# Patient Record
Sex: FEMALE | Race: WHITE | NOT HISPANIC OR LATINO | Employment: OTHER | URBAN - METROPOLITAN AREA
[De-identification: names, ages, dates, MRNs, and addresses within clinical notes are randomized per-mention and may not be internally consistent; named-entity substitution may affect disease eponyms.]

---

## 2018-11-12 ENCOUNTER — OFFICE VISIT (OUTPATIENT)
Dept: ORTHOPEDIC SURGERY | Facility: CLINIC | Age: 68
End: 2018-11-12

## 2018-11-12 VITALS — HEIGHT: 67 IN | HEART RATE: 72 BPM | WEIGHT: 158.29 LBS | OXYGEN SATURATION: 98 % | BODY MASS INDEX: 24.84 KG/M2

## 2018-11-12 DIAGNOSIS — M25.551 PAIN OF RIGHT HIP JOINT: Primary | ICD-10-CM

## 2018-11-12 PROCEDURE — 99203 OFFICE O/P NEW LOW 30 MIN: CPT | Performed by: ORTHOPAEDIC SURGERY

## 2018-11-12 RX ORDER — GABAPENTIN 300 MG/1
300 CAPSULE ORAL 3 TIMES DAILY
COMMUNITY

## 2018-11-12 RX ORDER — LEVOTHYROXINE SODIUM 0.12 MG/1
125 TABLET ORAL DAILY
COMMUNITY
End: 2022-04-13 | Stop reason: HOSPADM

## 2018-11-12 RX ORDER — DEXTROAMPHETAMINE SACCHARATE, AMPHETAMINE ASPARTATE, DEXTROAMPHETAMINE SULFATE AND AMPHETAMINE SULFATE 5; 5; 5; 5 MG/1; MG/1; MG/1; MG/1
20 TABLET ORAL DAILY
COMMUNITY
End: 2022-04-13 | Stop reason: HOSPADM

## 2018-11-12 RX ORDER — ATORVASTATIN CALCIUM 20 MG/1
20 TABLET, FILM COATED ORAL DAILY
COMMUNITY
End: 2022-05-03 | Stop reason: SDUPTHER

## 2018-11-12 RX ORDER — IBUPROFEN 800 MG/1
800 TABLET ORAL EVERY 6 HOURS PRN
COMMUNITY

## 2018-11-12 NOTE — PROGRESS NOTES
[facility] [place] [facility] [place] [facility] [place]    Subjective     Chief Complaint: Right hip pain       HPI    Oneyda Luna is a 68 y.o. female.  She presents today for evaluation of right lateral hip pain.  The pain is been present for 2 years, following no particular injury.  The pain is associated with stiffness, and worsens with walking, as well as sleeping on her side.  The pain is 4 out of 10, and aching in quality.  She may have had a previous injection before, but she is not sure.      There is no problem list on file for this patient.    Past Medical History:   Diagnosis Date   • Graves disease    • Heart disease    • Hip arthrosis    • Knee swelling       Past Surgical History:   Procedure Laterality Date   • CARDIAC SURGERY      2017 stints   • WRIST SURGERY      2004 right wrist      Family History   Problem Relation Age of Onset   • Osteoarthritis Mother    • Hypertension Mother    • Heart attack Mother    • Osteoarthritis Father    • Hypertension Father      Social History     Socioeconomic History   • Marital status:      Spouse name: Not on file   • Number of children: Not on file   • Years of education: Not on file   • Highest education level: Not on file   Social Needs   • Financial resource strain: Not on file   • Food insecurity - worry: Not on file   • Food insecurity - inability: Not on file   • Transportation needs - medical: Not on file   • Transportation needs - non-medical: Not on file   Occupational History   • Not on file   Tobacco Use   • Smoking status: Former Smoker     Years: 30.00   • Smokeless tobacco: Never Used   • Tobacco comment: Feb 2017   Substance and Sexual Activity   • Alcohol use: No     Frequency: Never   • Drug use: No   • Sexual activity: Defer   Other Topics Concern   • Not on file   Social History Narrative   • Not on file      Current Outpatient Medications on File Prior to Visit   Medication Sig Dispense Refill   • amphetamine-dextroamphetamine (ADDERALL) 20 MG  "tablet Take 20 mg by mouth Daily.     • atorvastatin (LIPITOR) 20 MG tablet Take 20 mg by mouth Daily.     • Cholecalciferol (VITAMIN D3) 5000 units capsule capsule Take 5,000 Units by mouth Daily.     • estrogens, conjugated, (PREMARIN) 0.3 MG tablet Take 0.3 mg by mouth Daily. Take daily for 21 days then do not take for 7 days.     • gabapentin (NEURONTIN) 300 MG capsule Take 300 mg by mouth 3 (Three) Times a Day.     • ibuprofen (ADVIL,MOTRIN) 800 MG tablet Take 800 mg by mouth Every 6 (Six) Hours As Needed for Mild Pain .     • levothyroxine (SYNTHROID, LEVOTHROID) 125 MCG tablet Take 125 mcg by mouth Daily.       No current facility-administered medications on file prior to visit.       Allergies   Allergen Reactions   • Molds & Smuts Other (See Comments)     Sneezing and congestion          Review of Systems   Constitutional: Positive for activity change, fatigue and unexpected weight change.   HENT: Positive for congestion, ear pain, facial swelling, mouth sores, sinus pressure and sore throat.    Eyes: Positive for visual disturbance.   Respiratory: Positive for cough, choking and shortness of breath.    Gastrointestinal: Positive for abdominal distention.   Endocrine: Positive for cold intolerance and heat intolerance.   Genitourinary: Positive for frequency, pelvic pain and urgency.   Musculoskeletal: Positive for arthralgias, back pain, gait problem, joint swelling, myalgias, neck pain and neck stiffness.   Neurological: Positive for weakness and numbness.   Psychiatric/Behavioral: Positive for confusion.        Objective      Physical Exam  Pulse 72   Ht 170.2 cm (67\")   Wt 71.8 kg (158 lb 4.6 oz)   SpO2 98%   BMI 24.79 kg/m²     Body mass index is 24.79 kg/m².    General:   Mental Status:  Alert   Appearance: Cooperative, in no acute distress   Build and Nutrition: Well-nourished and well developed female   Orientation: Alert and oriented to person, place and time   Posture: Normal   Gait: " Normal    Integument:   Right hip: No skin lesions, no rash, no ecchymosis    Neurologic:   Sensation:    Right foot: Intact to light touch on the dorsal and plantar aspect   Motor:  Right lower extremity: 5/5 quadriceps, hamstrings, ankle dorsiflexors, and ankle plantar flexors    Vascular:   Right lower extremity: 2+ dorsalis pedis pulse, prompt capillary refill    Lower Extremities:   Right Hip:    Tenderness:  Over the greater trochanter    Swelling: None    Crepitus:  None    Atrophy:  None    Range of motion:  External Rotation: 30°       Internal Rotation: 30°       Flexion:  100°       Extension:  0°   Instability:  None  Deformities:  Soft tissues are sunken in just below the greater trochanter, different than the contralateral side  Functional testing: Negative Stinchfield    No leg length discrepancy        Imaging/Studies    See x-ray report from today.  Findings are consistent with mild arthritis in the hip.    Assessment and Plan     Diagnoses and all orders for this visit:    Pain of right hip joint  -     XR Hip With or Without Pelvis 2 - 3 View Right; Future  -     MRI Hip Right Without Contrast; Future        I reviewed my findings with patient today.  Findings are consistent with trochanteric bursitis, which she does have an area of soft tissue atrophied distal to this.  I would like an MRI of her hip, and this is been ordered.  I will see her back after the MRI, but sooner for any problems.    Return for After Imaging Study.      Medical Decision Making  Data/Risk: radiology tests and independent visualization of imaging, lab tests, or EMG/NCV      Ramesh Gerardo MD  11/12/18  2:43 PM

## 2018-11-20 ENCOUNTER — HOSPITAL ENCOUNTER (OUTPATIENT)
Dept: MRI IMAGING | Facility: HOSPITAL | Age: 68
Discharge: HOME | End: 2018-11-20
Attending: ORTHOPAEDIC SURGERY | Admitting: ORTHOPAEDIC SURGERY

## 2018-11-20 DIAGNOSIS — M25.551 PAIN OF RIGHT HIP JOINT: ICD-10-CM

## 2018-11-20 PROCEDURE — 73721 MRI JNT OF LWR EXTRE W/O DYE: CPT

## 2018-12-05 ENCOUNTER — OFFICE VISIT (OUTPATIENT)
Dept: ORTHOPEDIC SURGERY | Facility: CLINIC | Age: 68
End: 2018-12-05

## 2018-12-05 VITALS — OXYGEN SATURATION: 100 % | BODY MASS INDEX: 24.22 KG/M2 | HEIGHT: 67 IN | HEART RATE: 91 BPM | WEIGHT: 154.32 LBS

## 2018-12-05 DIAGNOSIS — M24.851 SNAPPING HIP SYNDROME, RIGHT: Primary | ICD-10-CM

## 2018-12-05 PROCEDURE — 99213 OFFICE O/P EST LOW 20 MIN: CPT | Performed by: ORTHOPAEDIC SURGERY

## 2018-12-05 RX ORDER — VORTIOXETINE 20 MG/1
TABLET, FILM COATED ORAL
COMMUNITY
Start: 2018-11-27 | End: 2022-04-13 | Stop reason: HOSPADM

## 2018-12-05 NOTE — PROGRESS NOTES
[facility] – [place] [facility] Note    Subjective     Chief Complaint   Patient presents with   • Right Hip - Follow-up     Pain of Right Hip Joint; Post MRI         HPI    Oneyda Luna is a 68 y.o. female.  She follows up today for her right hip MRI results.  No new complaints today.  4 out of 10 pain, with snapping in the hip.  Worse with walking and climbing stairs.  No improvement with anti-inflammatories.      There is no problem list on file for this patient.    Past Medical History:   Diagnosis Date   • Graves disease    • Heart disease    • Hip arthrosis    • Knee swelling       Past Surgical History:   Procedure Laterality Date   • CARDIAC SURGERY      2017 stints   • WRIST SURGERY      2004 right wrist      Family History   Problem Relation Age of Onset   • Osteoarthritis Mother    • Hypertension Mother    • Heart attack Mother    • Osteoarthritis Father    • Hypertension Father      Social History     Socioeconomic History   • Marital status:      Spouse name: Not on file   • Number of children: Not on file   • Years of education: Not on file   • Highest education level: Not on file   Social Needs   • Financial resource strain: Not on file   • Food insecurity - worry: Not on file   • Food insecurity - inability: Not on file   • Transportation needs - medical: Not on file   • Transportation needs - non-medical: Not on file   Occupational History   • Not on file   Tobacco Use   • Smoking status: Former Smoker     Years: 30.00   • Smokeless tobacco: Never Used   • Tobacco comment: Feb 2017   Substance and Sexual Activity   • Alcohol use: No     Frequency: Never   • Drug use: No   • Sexual activity: Defer   Other Topics Concern   • Not on file   Social History Narrative   • Not on file      Current Outpatient Medications on File Prior to Visit   Medication Sig Dispense Refill   • amphetamine-dextroamphetamine (ADDERALL) 20 MG tablet Take 20 mg by mouth Daily.     • atorvastatin (LIPITOR) 20 MG  tablet Take 20 mg by mouth Daily.     • Cholecalciferol (VITAMIN D3) 5000 units capsule capsule Take 5,000 Units by mouth Daily.     • estrogens, conjugated, (PREMARIN) 0.3 MG tablet Take 0.3 mg by mouth Daily. Take daily for 21 days then do not take for 7 days.     • gabapentin (NEURONTIN) 300 MG capsule Take 300 mg by mouth 3 (Three) Times a Day.     • ibuprofen (ADVIL,MOTRIN) 800 MG tablet Take 800 mg by mouth Every 6 (Six) Hours As Needed for Mild Pain .     • levothyroxine (SYNTHROID, LEVOTHROID) 125 MCG tablet Take 125 mcg by mouth Daily.     • TRINTELLIX 20 MG tablet        No current facility-administered medications on file prior to visit.       Allergies   Allergen Reactions   • Molds & Smuts Other (See Comments)     Sneezing and congestion          Review of Systems   Constitutional: Positive for fatigue. Negative for activity change, appetite change, chills, diaphoresis, fever and unexpected weight change.   HENT: Negative for congestion, dental problem, drooling, ear discharge, ear pain, facial swelling, hearing loss, mouth sores, nosebleeds, postnasal drip, rhinorrhea, sinus pressure, sneezing, sore throat, tinnitus, trouble swallowing and voice change.    Eyes: Negative for photophobia, pain, discharge, redness, itching and visual disturbance (Without Glasses ).   Respiratory: Negative for apnea, cough, choking, chest tightness, shortness of breath, wheezing and stridor.    Cardiovascular: Negative for chest pain, palpitations and leg swelling.   Gastrointestinal: Negative for abdominal distention, abdominal pain, anal bleeding, blood in stool, constipation, diarrhea, nausea, rectal pain and vomiting.   Endocrine: Negative for cold intolerance, heat intolerance, polydipsia, polyphagia and polyuria.   Genitourinary: Negative for decreased urine volume, difficulty urinating, dysuria, enuresis, flank pain, frequency, genital sores, hematuria and urgency.   Musculoskeletal: Positive for joint swelling.  "Negative for arthralgias, back pain, gait problem, myalgias, neck pain and neck stiffness.   Skin: Negative for color change, pallor, rash and wound.   Allergic/Immunologic: Negative for environmental allergies, food allergies and immunocompromised state.   Neurological: Negative for dizziness, tremors, seizures, syncope, facial asymmetry, speech difficulty, weakness, light-headedness, numbness and headaches.   Hematological: Negative for adenopathy. Does not bruise/bleed easily.   Psychiatric/Behavioral: Negative for agitation, behavioral problems, confusion, decreased concentration, dysphoric mood, hallucinations, self-injury, sleep disturbance and suicidal ideas. The patient is not nervous/anxious and is not hyperactive.         Objective      Physical Exam  Pulse 91   Ht 170.2 cm (67.01\")   Wt 70 kg (154 lb 5.2 oz)   SpO2 100%   BMI 24.16 kg/m²     Body mass index is 24.16 kg/m².    General:   Mental Status:  Alert   Appearance: Cooperative, in no acute distress   Build and Nutrition: Well-nourished and well developed female   Orientation: Alert and oriented to person, place and time   Posture: Normal   Gait: Normal    Integument:   Right hip: No skin lesions, no rash, no ecchymosis    Lower Extremity:   Right Hip:    Tenderness:  Mild lateral tenderness    Swelling:  None    Crepitus:  None    Range of motion:  External Rotation: 30°       Internal Rotation: 30°       Flexion:  100°       Extension:  0°    Deformities:  None  Functional testing: Negative Stinchfield    No leg length discrepancy        Imaging/Studies  EXAMINATION: MRI HIP RIGHT WO CONTRAST- 11/20/2018     INDICATION: M25.551-Pain in right hip         TECHNIQUE: Exam consists of axial and coronal T1, T2, proton density and  STIR images of the pelvis and hips, small field-of-view sagittal T2  fat-sat and coronal 3-D gradient echo images of the right hip.     COMPARISON: Right hip plain films 11/12/2018     FINDINGS: Patient history indicates " right lateral hip pain X2 years,  gradually worsening. No particular injury. Exam findings consistent with  trochanteric bursitis.     No marrow edema or other pathologic marrow signal changes are seen in  the included pelvic bones or proximal femurs. In particular, no evidence  of fracture or stress reaction is seen in the femoral head or neck  regions, right or left acetabulum or right or left pubic rami. There is  no evidence of significant joint effusion. Femoral head contours are  normally rounded. STIR images show no significant muscle or soft tissue  edema. Remaining sequences show no evidence of asymmetric muscle atrophy  or hematoma. Attachments of the right and left iliopsoas tendons,  gluteus and hamstring attachments appear normal. In particular, no right  gluteus medius or minimus tendinopathy is identified. No intrapelvic  mass inflammatory change or ascites is seen.     Small field-of-view images show grossly normal-appearing outline of the  articular cartilage of the right hip. There is a 1.1 cm anterior  paralabral cyst, and what appears to be fluid signal extending across  the base of the labrum, suspicious for anterior labral tear.     IMPRESSION:  1. Suspected anterior labral tear of the right hip and small associated  paralabral cyst.  2. No evidence of significant right hip pathology elsewhere.     D:  11/20/2018  E:  11/21/2018      Assessment and Plan     Oneyda was seen today for follow-up.    Diagnoses and all orders for this visit:    Snapping hip syndrome, right  -     Ambulatory Referral to Physical Therapy Evaluate and treat        I reviewed my findings with patient today.  MRI was fairly unremarkable, with only mild degeneration of the hip.  No significant bursal changes, and the gluteal musculature is intact.  She describes popping in the hip, and I suspect that she has a snapping hip.  At this point, recommended a trial of physical therapy, and a referral was provided.  I will see  her back in 2 months to ensure improvement, but sooner for any problems.    Return in about 2 months (around 2/5/2019).      Medical Decision Making  Management Options : physical/occupational therapy  Data/Risk: independent visualization of imaging, lab tests, or EMG/NCV      Ramesh Gerardo MD  12/05/18  4:03 PM

## 2019-02-06 ENCOUNTER — OFFICE VISIT (OUTPATIENT)
Dept: ORTHOPEDIC SURGERY | Facility: CLINIC | Age: 69
End: 2019-02-06

## 2019-02-06 VITALS — HEART RATE: 112 BPM | WEIGHT: 149.03 LBS | BODY MASS INDEX: 23.39 KG/M2 | HEIGHT: 67 IN | OXYGEN SATURATION: 95 %

## 2019-02-06 DIAGNOSIS — M25.551 PAIN OF RIGHT HIP JOINT: ICD-10-CM

## 2019-02-06 DIAGNOSIS — M24.851 SNAPPING HIP SYNDROME, RIGHT: Primary | ICD-10-CM

## 2019-02-06 PROCEDURE — 99212 OFFICE O/P EST SF 10 MIN: CPT | Performed by: ORTHOPAEDIC SURGERY

## 2019-02-06 NOTE — PROGRESS NOTES
[facility] [place] [facility] [place]    Subjective     Chief Complaint   Patient presents with   • Right Hip - Follow-up     2 month follow up.        HPI    Oneyda Luna is a 68 y.o. female.  She follows up today for right hip.  She is improved from her last visit, and is not experiencing popping.  She did not do physical therapy as discussed.  She had a neck problem that precluded that.  She has occasional 2 out of 10 pain in the hip.  No new complaints today.      There is no problem list on file for this patient.    Past Medical History:   Diagnosis Date   • Graves disease    • Heart disease    • Hip arthrosis    • Knee swelling       Past Surgical History:   Procedure Laterality Date   • CARDIAC SURGERY      2017 stints   • WRIST SURGERY      2004 right wrist      Family History   Problem Relation Age of Onset   • Osteoarthritis Mother    • Hypertension Mother    • Heart attack Mother    • Osteoarthritis Father    • Hypertension Father      Social History     Socioeconomic History   • Marital status:      Spouse name: Not on file   • Number of children: Not on file   • Years of education: Not on file   • Highest education level: Not on file   Social Needs   • Financial resource strain: Not on file   • Food insecurity - worry: Not on file   • Food insecurity - inability: Not on file   • Transportation needs - medical: Not on file   • Transportation needs - non-medical: Not on file   Occupational History   • Not on file   Tobacco Use   • Smoking status: Former Smoker     Years: 30.00   • Smokeless tobacco: Never Used   • Tobacco comment: Feb 2017   Substance and Sexual Activity   • Alcohol use: No     Frequency: Never   • Drug use: No   • Sexual activity: Defer   Other Topics Concern   • Not on file   Social History Narrative   • Not on file      Current Outpatient Medications on File Prior to Visit   Medication Sig Dispense Refill   • amphetamine-dextroamphetamine (ADDERALL) 20 MG tablet Take 20 mg by  "mouth Daily.     • atorvastatin (LIPITOR) 20 MG tablet Take 20 mg by mouth Daily.     • Cholecalciferol (VITAMIN D3) 5000 units capsule capsule Take 5,000 Units by mouth Daily.     • estrogens, conjugated, (PREMARIN) 0.3 MG tablet Take 0.3 mg by mouth Daily. Take daily for 21 days then do not take for 7 days.     • gabapentin (NEURONTIN) 300 MG capsule Take 300 mg by mouth 3 (Three) Times a Day.     • ibuprofen (ADVIL,MOTRIN) 800 MG tablet Take 800 mg by mouth Every 6 (Six) Hours As Needed for Mild Pain .     • levothyroxine (SYNTHROID, LEVOTHROID) 125 MCG tablet Take 125 mcg by mouth Daily.     • TRINTELLIX 20 MG tablet        No current facility-administered medications on file prior to visit.       Allergies   Allergen Reactions   • Molds & Smuts Other (See Comments)     Sneezing and congestion          Review of Systems   Constitutional: Positive for activity change.   HENT: Negative.    Eyes: Negative.    Respiratory: Negative.    Cardiovascular: Negative.    Gastrointestinal: Negative.    Endocrine: Negative.    Genitourinary: Negative.    Musculoskeletal: Positive for arthralgias, gait problem, joint swelling, neck pain and neck stiffness.   Skin: Negative.    Allergic/Immunologic: Negative.    Hematological: Negative.    Psychiatric/Behavioral: Negative.         Objective      Physical Exam  Pulse 112   Ht 170.2 cm (67.01\")   Wt 67.6 kg (149 lb 0.5 oz)   SpO2 95%   BMI 23.33 kg/m²     Body mass index is 23.33 kg/m².    General:   Mental Status:  Alert   Appearance: Cooperative, in no acute distress   Build and Nutrition: Well-nourished and well developed female   Orientation: Alert and oriented to person, place and time   Posture: Normal   Gait: Normal    Integument:   Right hip: No skin lesions, no rash, no ecchymosis    Lower Extremity:   Right Hip:    Tenderness:  None    Swelling:  None    Crepitus:  None    Range of motion:  External Rotation: 40°       Internal " Rotation: 40°       Flexion:  100°       Extension:  0°    Deformities:  None  [facility]    No leg length discrepancy          Assessment and Plan     Oneyda was seen today for follow-up.    Diagnoses and all orders for this visit:    Snapping hip syndrome, right    Pain of right hip joint        I reviewed my findings with patient today.  Her right hip is improved from last visit, and I will see her back if she has any worsening or problems in the future.  Physical therapy may be prescribed if she has recurrence in the future.    Return if symptoms worsen or fail to improve.      Ramesh Gerardo MD  02/06/19  3:37 PM

## 2022-04-09 ENCOUNTER — APPOINTMENT (OUTPATIENT)
Dept: GENERAL RADIOLOGY | Facility: HOSPITAL | Age: 72
End: 2022-04-09

## 2022-04-09 ENCOUNTER — APPOINTMENT (OUTPATIENT)
Dept: CT IMAGING | Facility: HOSPITAL | Age: 72
End: 2022-04-09

## 2022-04-09 ENCOUNTER — HOSPITAL ENCOUNTER (INPATIENT)
Facility: HOSPITAL | Age: 72
LOS: 4 days | Discharge: HOME | End: 2022-04-13
Attending: EMERGENCY MEDICINE | Admitting: INTERNAL MEDICINE

## 2022-04-09 DIAGNOSIS — Z86.73 HISTORY OF CVA (CEREBROVASCULAR ACCIDENT): ICD-10-CM

## 2022-04-09 DIAGNOSIS — R59.0 MEDIASTINAL LYMPHADENOPATHY: ICD-10-CM

## 2022-04-09 DIAGNOSIS — R41.82 ALTERED MENTAL STATUS, UNSPECIFIED ALTERED MENTAL STATUS TYPE: Primary | ICD-10-CM

## 2022-04-09 DIAGNOSIS — I77.74 VERTEBRAL ARTERY DISSECTION: ICD-10-CM

## 2022-04-09 DIAGNOSIS — R41.841 COGNITIVE COMMUNICATION DEFICIT: ICD-10-CM

## 2022-04-09 LAB
ALBUMIN SERPL-MCNC: 4.2 G/DL (ref 3.5–5.2)
ALBUMIN/GLOB SERPL: 1.4 G/DL
ALP SERPL-CCNC: 178 U/L (ref 39–117)
ALT SERPL W P-5'-P-CCNC: 11 U/L (ref 1–33)
ANION GAP SERPL CALCULATED.3IONS-SCNC: 10 MMOL/L (ref 5–15)
AST SERPL-CCNC: 18 U/L (ref 1–32)
BASOPHILS # BLD AUTO: 0.1 10*3/MM3 (ref 0–0.2)
BASOPHILS NFR BLD AUTO: 1.2 % (ref 0–1.5)
BILIRUB SERPL-MCNC: 0.3 MG/DL (ref 0–1.2)
BUN SERPL-MCNC: 12 MG/DL (ref 8–23)
BUN/CREAT SERPL: 14.1 (ref 7–25)
CALCIUM SPEC-SCNC: 9.6 MG/DL (ref 8.6–10.5)
CHLORIDE SERPL-SCNC: 105 MMOL/L (ref 98–107)
CO2 SERPL-SCNC: 26 MMOL/L (ref 22–29)
CREAT SERPL-MCNC: 0.85 MG/DL (ref 0.57–1)
DEPRECATED RDW RBC AUTO: 45.6 FL (ref 37–54)
EGFRCR SERPLBLD CKD-EPI 2021: 73.4 ML/MIN/1.73
EOSINOPHIL # BLD AUTO: 1.01 10*3/MM3 (ref 0–0.4)
EOSINOPHIL NFR BLD AUTO: 12.1 % (ref 0.3–6.2)
ERYTHROCYTE [DISTWIDTH] IN BLOOD BY AUTOMATED COUNT: 13.5 % (ref 12.3–15.4)
GLOBULIN UR ELPH-MCNC: 3.1 GM/DL
GLUCOSE SERPL-MCNC: 77 MG/DL (ref 65–99)
HCT VFR BLD AUTO: 35.4 % (ref 34–46.6)
HGB BLD-MCNC: 11.5 G/DL (ref 12–15.9)
HOLD SPECIMEN: NORMAL
HOLD SPECIMEN: NORMAL
IMM GRANULOCYTES # BLD AUTO: 0.02 10*3/MM3 (ref 0–0.05)
IMM GRANULOCYTES NFR BLD AUTO: 0.2 % (ref 0–0.5)
LYMPHOCYTES # BLD AUTO: 2.2 10*3/MM3 (ref 0.7–3.1)
LYMPHOCYTES NFR BLD AUTO: 26.3 % (ref 19.6–45.3)
MAGNESIUM SERPL-MCNC: 2 MG/DL (ref 1.6–2.4)
MCH RBC QN AUTO: 29.9 PG (ref 26.6–33)
MCHC RBC AUTO-ENTMCNC: 32.5 G/DL (ref 31.5–35.7)
MCV RBC AUTO: 91.9 FL (ref 79–97)
MONOCYTES # BLD AUTO: 1.11 10*3/MM3 (ref 0.1–0.9)
MONOCYTES NFR BLD AUTO: 13.3 % (ref 5–12)
NEUTROPHILS NFR BLD AUTO: 3.93 10*3/MM3 (ref 1.7–7)
NEUTROPHILS NFR BLD AUTO: 46.9 % (ref 42.7–76)
NRBC BLD AUTO-RTO: 0 /100 WBC (ref 0–0.2)
PLATELET # BLD AUTO: 285 10*3/MM3 (ref 140–450)
PMV BLD AUTO: 10.5 FL (ref 6–12)
POTASSIUM SERPL-SCNC: 3.5 MMOL/L (ref 3.5–5.2)
PROT SERPL-MCNC: 7.3 G/DL (ref 6–8.5)
RBC # BLD AUTO: 3.85 10*6/MM3 (ref 3.77–5.28)
SARS-COV-2 RDRP RESP QL NAA+PROBE: NORMAL
SODIUM SERPL-SCNC: 141 MMOL/L (ref 136–145)
T4 FREE SERPL-MCNC: 1.44 NG/DL (ref 0.93–1.7)
TROPONIN T SERPL-MCNC: <0.01 NG/ML (ref 0–0.03)
TSH SERPL DL<=0.05 MIU/L-ACNC: 0.1 UIU/ML (ref 0.27–4.2)
WBC NRBC COR # BLD: 8.37 10*3/MM3 (ref 3.4–10.8)
WHOLE BLOOD HOLD SPECIMEN: NORMAL
WHOLE BLOOD HOLD SPECIMEN: NORMAL

## 2022-04-09 PROCEDURE — 84439 ASSAY OF FREE THYROXINE: CPT | Performed by: EMERGENCY MEDICINE

## 2022-04-09 PROCEDURE — 80053 COMPREHEN METABOLIC PANEL: CPT | Performed by: EMERGENCY MEDICINE

## 2022-04-09 PROCEDURE — 0 IOPAMIDOL PER 1 ML: Performed by: EMERGENCY MEDICINE

## 2022-04-09 PROCEDURE — 87635 SARS-COV-2 COVID-19 AMP PRB: CPT | Performed by: EMERGENCY MEDICINE

## 2022-04-09 PROCEDURE — 70496 CT ANGIOGRAPHY HEAD: CPT

## 2022-04-09 PROCEDURE — 99284 EMERGENCY DEPT VISIT MOD MDM: CPT

## 2022-04-09 PROCEDURE — 83735 ASSAY OF MAGNESIUM: CPT | Performed by: EMERGENCY MEDICINE

## 2022-04-09 PROCEDURE — 84481 FREE ASSAY (FT-3): CPT | Performed by: INTERNAL MEDICINE

## 2022-04-09 PROCEDURE — 84443 ASSAY THYROID STIM HORMONE: CPT | Performed by: EMERGENCY MEDICINE

## 2022-04-09 PROCEDURE — 71045 X-RAY EXAM CHEST 1 VIEW: CPT

## 2022-04-09 PROCEDURE — 70450 CT HEAD/BRAIN W/O DYE: CPT

## 2022-04-09 PROCEDURE — 99223 1ST HOSP IP/OBS HIGH 75: CPT | Performed by: INTERNAL MEDICINE

## 2022-04-09 PROCEDURE — 85025 COMPLETE CBC W/AUTO DIFF WBC: CPT | Performed by: EMERGENCY MEDICINE

## 2022-04-09 PROCEDURE — 82550 ASSAY OF CK (CPK): CPT | Performed by: INTERNAL MEDICINE

## 2022-04-09 PROCEDURE — 70498 CT ANGIOGRAPHY NECK: CPT

## 2022-04-09 PROCEDURE — 84484 ASSAY OF TROPONIN QUANT: CPT | Performed by: EMERGENCY MEDICINE

## 2022-04-09 PROCEDURE — 84145 PROCALCITONIN (PCT): CPT | Performed by: INTERNAL MEDICINE

## 2022-04-09 RX ORDER — SODIUM CHLORIDE 0.9 % (FLUSH) 0.9 %
10 SYRINGE (ML) INJECTION AS NEEDED
Status: DISCONTINUED | OUTPATIENT
Start: 2022-04-09 | End: 2022-04-13 | Stop reason: HOSPADM

## 2022-04-09 RX ADMIN — IOPAMIDOL 75 ML: 755 INJECTION, SOLUTION INTRAVENOUS at 22:23

## 2022-04-10 ENCOUNTER — APPOINTMENT (OUTPATIENT)
Dept: CT IMAGING | Facility: HOSPITAL | Age: 72
End: 2022-04-10

## 2022-04-10 ENCOUNTER — APPOINTMENT (OUTPATIENT)
Dept: CARDIOLOGY | Facility: HOSPITAL | Age: 72
End: 2022-04-10

## 2022-04-10 ENCOUNTER — APPOINTMENT (OUTPATIENT)
Dept: ULTRASOUND IMAGING | Facility: HOSPITAL | Age: 72
End: 2022-04-10

## 2022-04-10 PROBLEM — E05.00 GRAVES DISEASE: Status: ACTIVE | Noted: 2022-04-10

## 2022-04-10 PROBLEM — R11.2 NAUSEA AND VOMITING: Status: ACTIVE | Noted: 2022-04-10

## 2022-04-10 LAB
AMMONIA BLD-SCNC: 22 UMOL/L (ref 11–51)
AMPHET+METHAMPHET UR QL: POSITIVE
AMPHETAMINES UR QL: NEGATIVE
BACTERIA UR QL AUTO: ABNORMAL /HPF
BARBITURATES UR QL SCN: NEGATIVE
BENZODIAZ UR QL SCN: NEGATIVE
BILIRUB UR QL STRIP: NEGATIVE
BUPRENORPHINE SERPL-MCNC: NEGATIVE NG/ML
CANNABINOIDS SERPL QL: NEGATIVE
CHOLEST SERPL-MCNC: 155 MG/DL (ref 0–200)
CK SERPL-CCNC: 74 U/L (ref 20–180)
CLARITY UR: CLEAR
COCAINE UR QL: NEGATIVE
COLOR UR: YELLOW
D-LACTATE SERPL-SCNC: 0.8 MMOL/L (ref 0.5–2)
GLUCOSE BLDC GLUCOMTR-MCNC: 111 MG/DL (ref 70–130)
GLUCOSE BLDC GLUCOMTR-MCNC: 123 MG/DL (ref 70–130)
GLUCOSE BLDC GLUCOMTR-MCNC: 85 MG/DL (ref 70–130)
GLUCOSE UR STRIP-MCNC: NEGATIVE MG/DL
HBA1C MFR BLD: 5.6 % (ref 4.8–5.6)
HDLC SERPL-MCNC: 68 MG/DL (ref 40–60)
HGB UR QL STRIP.AUTO: NEGATIVE
HOLD SPECIMEN: NORMAL
HYALINE CASTS UR QL AUTO: ABNORMAL /LPF
KETONES UR QL STRIP: NEGATIVE
LDLC SERPL CALC-MCNC: 73 MG/DL (ref 0–100)
LDLC/HDLC SERPL: 1.07 {RATIO}
LEUKOCYTE ESTERASE UR QL STRIP.AUTO: ABNORMAL
METHADONE UR QL SCN: NEGATIVE
NITRITE UR QL STRIP: NEGATIVE
OPIATES UR QL: NEGATIVE
OXYCODONE UR QL SCN: NEGATIVE
PCP UR QL SCN: NEGATIVE
PH UR STRIP.AUTO: 6.5 [PH] (ref 5–8)
PROCALCITONIN SERPL-MCNC: <0.2 NG/ML (ref 0–0.25)
PROPOXYPH UR QL: NEGATIVE
PROT UR QL STRIP: NEGATIVE
RBC # UR STRIP: ABNORMAL /HPF
REF LAB TEST METHOD: ABNORMAL
SP GR UR STRIP: 1.06 (ref 1–1.03)
SQUAMOUS #/AREA URNS HPF: ABNORMAL /HPF
T3FREE SERPL-MCNC: 3.14 PG/ML (ref 2–4.4)
TRICYCLICS UR QL SCN: NEGATIVE
TRIGL SERPL-MCNC: 71 MG/DL (ref 0–150)
UROBILINOGEN UR QL STRIP: ABNORMAL
VLDLC SERPL-MCNC: 14 MG/DL (ref 5–40)
WBC # UR STRIP: ABNORMAL /HPF

## 2022-04-10 PROCEDURE — 82962 GLUCOSE BLOOD TEST: CPT

## 2022-04-10 PROCEDURE — 25010000002 LORAZEPAM PER 2 MG: Performed by: INTERNAL MEDICINE

## 2022-04-10 PROCEDURE — 97166 OT EVAL MOD COMPLEX 45 MIN: CPT

## 2022-04-10 PROCEDURE — 99232 SBSQ HOSP IP/OBS MODERATE 35: CPT | Performed by: INTERNAL MEDICINE

## 2022-04-10 PROCEDURE — 87086 URINE CULTURE/COLONY COUNT: CPT | Performed by: INTERNAL MEDICINE

## 2022-04-10 PROCEDURE — 25010000002 PIPERACILLIN SOD-TAZOBACTAM PER 1 G: Performed by: INTERNAL MEDICINE

## 2022-04-10 PROCEDURE — 97116 GAIT TRAINING THERAPY: CPT

## 2022-04-10 PROCEDURE — 99222 1ST HOSP IP/OBS MODERATE 55: CPT | Performed by: NURSE PRACTITIONER

## 2022-04-10 PROCEDURE — 93306 TTE W/DOPPLER COMPLETE: CPT

## 2022-04-10 PROCEDURE — 87040 BLOOD CULTURE FOR BACTERIA: CPT | Performed by: INTERNAL MEDICINE

## 2022-04-10 PROCEDURE — 25010000002 IOPAMIDOL 61 % SOLUTION: Performed by: EMERGENCY MEDICINE

## 2022-04-10 PROCEDURE — 80306 DRUG TEST PRSMV INSTRMNT: CPT | Performed by: INTERNAL MEDICINE

## 2022-04-10 PROCEDURE — 92523 SPEECH SOUND LANG COMPREHEN: CPT

## 2022-04-10 PROCEDURE — 82140 ASSAY OF AMMONIA: CPT | Performed by: INTERNAL MEDICINE

## 2022-04-10 PROCEDURE — 87186 SC STD MICRODIL/AGAR DIL: CPT | Performed by: INTERNAL MEDICINE

## 2022-04-10 PROCEDURE — 83036 HEMOGLOBIN GLYCOSYLATED A1C: CPT | Performed by: NURSE PRACTITIONER

## 2022-04-10 PROCEDURE — 81001 URINALYSIS AUTO W/SCOPE: CPT | Performed by: INTERNAL MEDICINE

## 2022-04-10 PROCEDURE — 87077 CULTURE AEROBIC IDENTIFY: CPT | Performed by: INTERNAL MEDICINE

## 2022-04-10 PROCEDURE — 97162 PT EVAL MOD COMPLEX 30 MIN: CPT

## 2022-04-10 PROCEDURE — 97530 THERAPEUTIC ACTIVITIES: CPT

## 2022-04-10 PROCEDURE — 74177 CT ABD & PELVIS W/CONTRAST: CPT

## 2022-04-10 PROCEDURE — 80061 LIPID PANEL: CPT | Performed by: NURSE PRACTITIONER

## 2022-04-10 PROCEDURE — 25010000002 KETOROLAC TROMETHAMINE PER 15 MG: Performed by: INTERNAL MEDICINE

## 2022-04-10 PROCEDURE — 83605 ASSAY OF LACTIC ACID: CPT | Performed by: INTERNAL MEDICINE

## 2022-04-10 PROCEDURE — 76775 US EXAM ABDO BACK WALL LIM: CPT

## 2022-04-10 PROCEDURE — 71260 CT THORAX DX C+: CPT

## 2022-04-10 RX ORDER — LORAZEPAM 2 MG/ML
1 INJECTION INTRAMUSCULAR ONCE
Status: COMPLETED | OUTPATIENT
Start: 2022-04-10 | End: 2022-04-10

## 2022-04-10 RX ORDER — SODIUM CHLORIDE, SODIUM LACTATE, POTASSIUM CHLORIDE, CALCIUM CHLORIDE 600; 310; 30; 20 MG/100ML; MG/100ML; MG/100ML; MG/100ML
75 INJECTION, SOLUTION INTRAVENOUS CONTINUOUS
Status: ACTIVE | OUTPATIENT
Start: 2022-04-10 | End: 2022-04-10

## 2022-04-10 RX ORDER — AMLODIPINE BESYLATE 5 MG/1
5 TABLET ORAL
Status: DISCONTINUED | OUTPATIENT
Start: 2022-04-11 | End: 2022-04-13 | Stop reason: HOSPADM

## 2022-04-10 RX ORDER — SODIUM CHLORIDE 0.9 % (FLUSH) 0.9 %
10 SYRINGE (ML) INJECTION AS NEEDED
Status: DISCONTINUED | OUTPATIENT
Start: 2022-04-10 | End: 2022-04-10 | Stop reason: SDUPTHER

## 2022-04-10 RX ORDER — METOPROLOL SUCCINATE 25 MG/1
25 TABLET, EXTENDED RELEASE ORAL
Status: DISCONTINUED | OUTPATIENT
Start: 2022-04-11 | End: 2022-04-13 | Stop reason: HOSPADM

## 2022-04-10 RX ORDER — LORAZEPAM 2 MG/ML
1 INJECTION INTRAMUSCULAR
Status: ACTIVE | OUTPATIENT
Start: 2022-04-10 | End: 2022-04-10

## 2022-04-10 RX ORDER — CHOLECALCIFEROL (VITAMIN D3) 125 MCG
10 CAPSULE ORAL NIGHTLY PRN
Status: DISCONTINUED | OUTPATIENT
Start: 2022-04-10 | End: 2022-04-13 | Stop reason: HOSPADM

## 2022-04-10 RX ORDER — LORAZEPAM 2 MG/ML
1 INJECTION INTRAMUSCULAR ONCE AS NEEDED
Status: COMPLETED | OUTPATIENT
Start: 2022-04-11 | End: 2022-04-12

## 2022-04-10 RX ORDER — SODIUM CHLORIDE 0.9 % (FLUSH) 0.9 %
10 SYRINGE (ML) INJECTION AS NEEDED
Status: DISCONTINUED | OUTPATIENT
Start: 2022-04-10 | End: 2022-04-13 | Stop reason: HOSPADM

## 2022-04-10 RX ORDER — GABAPENTIN 100 MG/1
200 CAPSULE ORAL NIGHTLY
Status: DISCONTINUED | OUTPATIENT
Start: 2022-04-10 | End: 2022-04-13 | Stop reason: HOSPADM

## 2022-04-10 RX ORDER — SODIUM CHLORIDE 0.9 % (FLUSH) 0.9 %
10 SYRINGE (ML) INJECTION EVERY 12 HOURS SCHEDULED
Status: DISCONTINUED | OUTPATIENT
Start: 2022-04-10 | End: 2022-04-10 | Stop reason: SDUPTHER

## 2022-04-10 RX ORDER — ACETAMINOPHEN 325 MG/1
650 TABLET ORAL EVERY 4 HOURS PRN
Status: DISCONTINUED | OUTPATIENT
Start: 2022-04-10 | End: 2022-04-13 | Stop reason: HOSPADM

## 2022-04-10 RX ORDER — ACETAMINOPHEN 160 MG/5ML
650 SOLUTION ORAL EVERY 4 HOURS PRN
Status: DISCONTINUED | OUTPATIENT
Start: 2022-04-10 | End: 2022-04-13 | Stop reason: HOSPADM

## 2022-04-10 RX ORDER — TRAMADOL HYDROCHLORIDE 50 MG/1
50 TABLET ORAL EVERY 6 HOURS PRN
Status: DISCONTINUED | OUTPATIENT
Start: 2022-04-10 | End: 2022-04-13 | Stop reason: HOSPADM

## 2022-04-10 RX ORDER — IBUPROFEN 400 MG/1
400 TABLET ORAL EVERY 6 HOURS PRN
Status: DISCONTINUED | OUTPATIENT
Start: 2022-04-10 | End: 2022-04-10

## 2022-04-10 RX ORDER — ASPIRIN 81 MG/1
81 TABLET, CHEWABLE ORAL DAILY
Status: DISCONTINUED | OUTPATIENT
Start: 2022-04-10 | End: 2022-04-13 | Stop reason: HOSPADM

## 2022-04-10 RX ORDER — ACETAMINOPHEN 650 MG/1
650 SUPPOSITORY RECTAL EVERY 4 HOURS PRN
Status: DISCONTINUED | OUTPATIENT
Start: 2022-04-10 | End: 2022-04-13 | Stop reason: HOSPADM

## 2022-04-10 RX ORDER — LEVOTHYROXINE SODIUM 0.1 MG/1
100 TABLET ORAL
Status: DISCONTINUED | OUTPATIENT
Start: 2022-04-10 | End: 2022-04-13 | Stop reason: HOSPADM

## 2022-04-10 RX ORDER — KETOROLAC TROMETHAMINE 15 MG/ML
15 INJECTION, SOLUTION INTRAMUSCULAR; INTRAVENOUS EVERY 6 HOURS PRN
Status: DISCONTINUED | OUTPATIENT
Start: 2022-04-10 | End: 2022-04-13 | Stop reason: HOSPADM

## 2022-04-10 RX ORDER — SODIUM CHLORIDE 0.9 % (FLUSH) 0.9 %
10 SYRINGE (ML) INJECTION EVERY 12 HOURS SCHEDULED
Status: DISCONTINUED | OUTPATIENT
Start: 2022-04-10 | End: 2022-04-13 | Stop reason: HOSPADM

## 2022-04-10 RX ORDER — ONDANSETRON 2 MG/ML
4 INJECTION INTRAMUSCULAR; INTRAVENOUS EVERY 6 HOURS PRN
Status: DISCONTINUED | OUTPATIENT
Start: 2022-04-10 | End: 2022-04-13 | Stop reason: HOSPADM

## 2022-04-10 RX ORDER — ATORVASTATIN CALCIUM 40 MG/1
80 TABLET, FILM COATED ORAL NIGHTLY
Status: DISCONTINUED | OUTPATIENT
Start: 2022-04-10 | End: 2022-04-13 | Stop reason: HOSPADM

## 2022-04-10 RX ORDER — ATORVASTATIN CALCIUM 20 MG/1
20 TABLET, FILM COATED ORAL DAILY
Status: DISCONTINUED | OUTPATIENT
Start: 2022-04-10 | End: 2022-04-10 | Stop reason: ALTCHOICE

## 2022-04-10 RX ORDER — LORAZEPAM 2 MG/ML
1 INJECTION INTRAMUSCULAR ONCE
Status: DISCONTINUED | OUTPATIENT
Start: 2022-04-10 | End: 2022-04-10

## 2022-04-10 RX ADMIN — KETOROLAC TROMETHAMINE 15 MG: 15 INJECTION, SOLUTION INTRAMUSCULAR; INTRAVENOUS at 09:30

## 2022-04-10 RX ADMIN — ATORVASTATIN CALCIUM 80 MG: 40 TABLET, FILM COATED ORAL at 20:17

## 2022-04-10 RX ADMIN — ACETAMINOPHEN 650 MG: 325 TABLET ORAL at 06:31

## 2022-04-10 RX ADMIN — TAZOBACTAM SODIUM AND PIPERACILLIN SODIUM 3.38 G: 375; 3 INJECTION, SOLUTION INTRAVENOUS at 17:09

## 2022-04-10 RX ADMIN — LORAZEPAM 1 MG: 2 INJECTION INTRAMUSCULAR; INTRAVENOUS at 01:57

## 2022-04-10 RX ADMIN — TAZOBACTAM SODIUM AND PIPERACILLIN SODIUM 3.38 G: 375; 3 INJECTION, SOLUTION INTRAVENOUS at 09:30

## 2022-04-10 RX ADMIN — ASPIRIN 81 MG 81 MG: 81 TABLET ORAL at 01:57

## 2022-04-10 RX ADMIN — SODIUM CHLORIDE, POTASSIUM CHLORIDE, SODIUM LACTATE AND CALCIUM CHLORIDE 75 ML/HR: 600; 310; 30; 20 INJECTION, SOLUTION INTRAVENOUS at 01:58

## 2022-04-10 RX ADMIN — IOPAMIDOL 75 ML: 612 INJECTION, SOLUTION INTRAVENOUS at 00:22

## 2022-04-10 RX ADMIN — TAZOBACTAM SODIUM AND PIPERACILLIN SODIUM 3.38 G: 375; 3 INJECTION, SOLUTION INTRAVENOUS at 05:12

## 2022-04-10 RX ADMIN — ATORVASTATIN CALCIUM 80 MG: 40 TABLET, FILM COATED ORAL at 01:57

## 2022-04-10 RX ADMIN — GABAPENTIN 200 MG: 100 CAPSULE ORAL at 22:01

## 2022-04-10 RX ADMIN — Medication 10 MG: at 22:01

## 2022-04-11 ENCOUNTER — APPOINTMENT (OUTPATIENT)
Dept: NEUROLOGY | Facility: HOSPITAL | Age: 72
End: 2022-04-11

## 2022-04-11 LAB
BH CV ECHO MEAS - AO MAX PG (FULL): 4.9 MMHG
BH CV ECHO MEAS - AO MAX PG: 9.9 MMHG
BH CV ECHO MEAS - AO MEAN PG (FULL): 1.4 MMHG
BH CV ECHO MEAS - AO MEAN PG: 3.9 MMHG
BH CV ECHO MEAS - AO ROOT AREA (BSA CORRECTED): 1.7
BH CV ECHO MEAS - AO ROOT AREA: 6.7 CM^2
BH CV ECHO MEAS - AO ROOT DIAM: 2.9 CM
BH CV ECHO MEAS - AO V2 MAX: 157.5 CM/SEC
BH CV ECHO MEAS - AO V2 MEAN: 88.4 CM/SEC
BH CV ECHO MEAS - AO V2 VTI: 24.3 CM
BH CV ECHO MEAS - ASC AORTA: 2.4 CM
BH CV ECHO MEAS - AVA(I,A): 1.9 CM^2
BH CV ECHO MEAS - AVA(I,D): 1.9 CM^2
BH CV ECHO MEAS - AVA(V,A): 2 CM^2
BH CV ECHO MEAS - AVA(V,D): 2 CM^2
BH CV ECHO MEAS - BSA(HAYCOCK): 1.7 M^2
BH CV ECHO MEAS - BSA: 1.7 M^2
BH CV ECHO MEAS - BZI_BMI: 21.3 KILOGRAMS/M^2
BH CV ECHO MEAS - BZI_METRIC_HEIGHT: 167.6 CM
BH CV ECHO MEAS - BZI_METRIC_WEIGHT: 59.9 KG
BH CV ECHO MEAS - EDV(CUBED): 63.9 ML
BH CV ECHO MEAS - EDV(MOD-SP2): 42 ML
BH CV ECHO MEAS - EDV(MOD-SP4): 41 ML
BH CV ECHO MEAS - EDV(TEICH): 69.9 ML
BH CV ECHO MEAS - EF(CUBED): 67.3 %
BH CV ECHO MEAS - EF(MOD-BP): 58 %
BH CV ECHO MEAS - EF(MOD-SP2): 59.5 %
BH CV ECHO MEAS - EF(MOD-SP4): 56.1 %
BH CV ECHO MEAS - EF(TEICH): 59.4 %
BH CV ECHO MEAS - ESV(CUBED): 20.9 ML
BH CV ECHO MEAS - ESV(MOD-SP2): 17 ML
BH CV ECHO MEAS - ESV(MOD-SP4): 18 ML
BH CV ECHO MEAS - ESV(TEICH): 28.4 ML
BH CV ECHO MEAS - FS: 31.1 %
BH CV ECHO MEAS - IVS/LVPW: 1
BH CV ECHO MEAS - IVSD: 0.87 CM
BH CV ECHO MEAS - LA DIMENSION: 3.4 CM
BH CV ECHO MEAS - LA/AO: 1.2
BH CV ECHO MEAS - LAD MAJOR: 3.8 CM
BH CV ECHO MEAS - LAT PEAK E' VEL: 10.1 CM/SEC
BH CV ECHO MEAS - LATERAL E/E' RATIO: 8.5
BH CV ECHO MEAS - LV DIASTOLIC VOL/BSA (35-75): 24.5 ML/M^2
BH CV ECHO MEAS - LV MASS(C)D: 103.8 GRAMS
BH CV ECHO MEAS - LV MASS(C)DI: 61.9 GRAMS/M^2
BH CV ECHO MEAS - LV MAX PG: 5 MMHG
BH CV ECHO MEAS - LV MEAN PG: 2.5 MMHG
BH CV ECHO MEAS - LV SYSTOLIC VOL/BSA (12-30): 10.7 ML/M^2
BH CV ECHO MEAS - LV V1 MAX: 111.8 CM/SEC
BH CV ECHO MEAS - LV V1 MEAN: 72.4 CM/SEC
BH CV ECHO MEAS - LV V1 VTI: 16.1 CM
BH CV ECHO MEAS - LVIDD: 4 CM
BH CV ECHO MEAS - LVIDS: 2.8 CM
BH CV ECHO MEAS - LVLD AP2: 6.6 CM
BH CV ECHO MEAS - LVLD AP4: 6.2 CM
BH CV ECHO MEAS - LVLS AP2: 5.1 CM
BH CV ECHO MEAS - LVLS AP4: 4.7 CM
BH CV ECHO MEAS - LVOT AREA (M): 2.8 CM^2
BH CV ECHO MEAS - LVOT AREA: 2.8 CM^2
BH CV ECHO MEAS - LVOT DIAM: 1.9 CM
BH CV ECHO MEAS - LVPWD: 0.86 CM
BH CV ECHO MEAS - MED PEAK E' VEL: 8.3 CM/SEC
BH CV ECHO MEAS - MEDIAL E/E' RATIO: 10.3
BH CV ECHO MEAS - MV A MAX VEL: 115.7 CM/SEC
BH CV ECHO MEAS - MV DEC SLOPE: 494.2 CM/SEC^2
BH CV ECHO MEAS - MV DEC TIME: 0.24 SEC
BH CV ECHO MEAS - MV E MAX VEL: 87.9 CM/SEC
BH CV ECHO MEAS - MV E/A: 0.76
BH CV ECHO MEAS - MV P1/2T MAX VEL: 89.5 CM/SEC
BH CV ECHO MEAS - MV P1/2T: 53 MSEC
BH CV ECHO MEAS - MVA P1/2T LCG: 2.5 CM^2
BH CV ECHO MEAS - MVA(P1/2T): 4.1 CM^2
BH CV ECHO MEAS - PA ACC SLOPE: 1038 CM/SEC^2
BH CV ECHO MEAS - PA ACC TIME: 0.1 SEC
BH CV ECHO MEAS - PA PR(ACCEL): 33.8 MMHG
BH CV ECHO MEAS - PI END-D VEL: 145.2 CM/SEC
BH CV ECHO MEAS - RAP SYSTOLE: 3 MMHG
BH CV ECHO MEAS - RVSP: 20.7 MMHG
BH CV ECHO MEAS - SI(AO): 97.5 ML/M^2
BH CV ECHO MEAS - SI(CUBED): 25.6 ML/M^2
BH CV ECHO MEAS - SI(LVOT): 27.3 ML/M^2
BH CV ECHO MEAS - SI(MOD-SP2): 14.9 ML/M^2
BH CV ECHO MEAS - SI(MOD-SP4): 13.7 ML/M^2
BH CV ECHO MEAS - SI(TEICH): 24.8 ML/M^2
BH CV ECHO MEAS - SV(AO): 163.5 ML
BH CV ECHO MEAS - SV(CUBED): 43 ML
BH CV ECHO MEAS - SV(LVOT): 45.7 ML
BH CV ECHO MEAS - SV(MOD-SP2): 25 ML
BH CV ECHO MEAS - SV(MOD-SP4): 23 ML
BH CV ECHO MEAS - SV(TEICH): 41.5 ML
BH CV ECHO MEAS - TR MAX PG: 17.7 MMHG
BH CV ECHO MEAS - TR MAX VEL: 210.4 CM/SEC
BH CV ECHO MEASUREMENTS AVERAGE E/E' RATIO: 9.55
GLUCOSE BLDC GLUCOMTR-MCNC: 85 MG/DL (ref 70–130)
LEFT ATRIUM VOLUME INDEX: 24.5 ML/M^2
LEFT ATRIUM VOLUME: 41 ML

## 2022-04-11 PROCEDURE — 95816 EEG AWAKE AND DROWSY: CPT

## 2022-04-11 PROCEDURE — 25010000002 CEFTRIAXONE PER 250 MG: Performed by: INTERNAL MEDICINE

## 2022-04-11 PROCEDURE — 82962 GLUCOSE BLOOD TEST: CPT

## 2022-04-11 PROCEDURE — 99232 SBSQ HOSP IP/OBS MODERATE 35: CPT | Performed by: PSYCHIATRY & NEUROLOGY

## 2022-04-11 PROCEDURE — 99232 SBSQ HOSP IP/OBS MODERATE 35: CPT | Performed by: INTERNAL MEDICINE

## 2022-04-11 PROCEDURE — 25010000002 PIPERACILLIN SOD-TAZOBACTAM PER 1 G: Performed by: INTERNAL MEDICINE

## 2022-04-11 RX ADMIN — TRAMADOL HYDROCHLORIDE 50 MG: 50 TABLET, COATED ORAL at 09:24

## 2022-04-11 RX ADMIN — AMLODIPINE BESYLATE 5 MG: 5 TABLET ORAL at 08:36

## 2022-04-11 RX ADMIN — ASPIRIN 81 MG 81 MG: 81 TABLET ORAL at 08:36

## 2022-04-11 RX ADMIN — Medication 10 MG: at 20:18

## 2022-04-11 RX ADMIN — TAZOBACTAM SODIUM AND PIPERACILLIN SODIUM 3.38 G: 375; 3 INJECTION, SOLUTION INTRAVENOUS at 09:23

## 2022-04-11 RX ADMIN — GABAPENTIN 200 MG: 100 CAPSULE ORAL at 20:19

## 2022-04-11 RX ADMIN — ATORVASTATIN CALCIUM 80 MG: 40 TABLET, FILM COATED ORAL at 20:19

## 2022-04-11 RX ADMIN — Medication 10 ML: at 20:19

## 2022-04-11 RX ADMIN — METOPROLOL SUCCINATE 25 MG: 25 TABLET, EXTENDED RELEASE ORAL at 08:36

## 2022-04-11 RX ADMIN — SODIUM CHLORIDE 1 G: 900 INJECTION INTRAVENOUS at 17:09

## 2022-04-11 RX ADMIN — TRAMADOL HYDROCHLORIDE 50 MG: 50 TABLET, COATED ORAL at 15:21

## 2022-04-11 RX ADMIN — TRAMADOL HYDROCHLORIDE 50 MG: 50 TABLET, COATED ORAL at 21:23

## 2022-04-11 RX ADMIN — TAZOBACTAM SODIUM AND PIPERACILLIN SODIUM 3.38 G: 375; 3 INJECTION, SOLUTION INTRAVENOUS at 03:36

## 2022-04-11 RX ADMIN — Medication 10 ML: at 08:37

## 2022-04-11 RX ADMIN — LEVOTHYROXINE SODIUM 100 MCG: 0.1 TABLET ORAL at 05:13

## 2022-04-12 ENCOUNTER — APPOINTMENT (OUTPATIENT)
Dept: MRI IMAGING | Facility: HOSPITAL | Age: 72
End: 2022-04-12

## 2022-04-12 LAB
BACTERIA SPEC AEROBE CULT: ABNORMAL
GLUCOSE BLDC GLUCOMTR-MCNC: 129 MG/DL (ref 70–130)
GLUCOSE BLDC GLUCOMTR-MCNC: 92 MG/DL (ref 70–130)
GLUCOSE BLDC GLUCOMTR-MCNC: 93 MG/DL (ref 70–130)

## 2022-04-12 PROCEDURE — 25010000002 LORAZEPAM PER 2 MG: Performed by: PSYCHIATRY & NEUROLOGY

## 2022-04-12 PROCEDURE — 92507 TX SP LANG VOICE COMM INDIV: CPT

## 2022-04-12 PROCEDURE — 99232 SBSQ HOSP IP/OBS MODERATE 35: CPT | Performed by: PSYCHIATRY & NEUROLOGY

## 2022-04-12 PROCEDURE — 25010000002 CEFTRIAXONE PER 250 MG: Performed by: INTERNAL MEDICINE

## 2022-04-12 PROCEDURE — 82962 GLUCOSE BLOOD TEST: CPT

## 2022-04-12 PROCEDURE — 99232 SBSQ HOSP IP/OBS MODERATE 35: CPT | Performed by: NURSE PRACTITIONER

## 2022-04-12 PROCEDURE — 70551 MRI BRAIN STEM W/O DYE: CPT

## 2022-04-12 RX ADMIN — LORAZEPAM 1 MG: 2 INJECTION INTRAMUSCULAR; INTRAVENOUS at 19:58

## 2022-04-12 RX ADMIN — GABAPENTIN 200 MG: 100 CAPSULE ORAL at 21:22

## 2022-04-12 RX ADMIN — SODIUM CHLORIDE 1 G: 900 INJECTION INTRAVENOUS at 17:55

## 2022-04-12 RX ADMIN — TRAMADOL HYDROCHLORIDE 50 MG: 50 TABLET, COATED ORAL at 16:15

## 2022-04-12 RX ADMIN — TRAMADOL HYDROCHLORIDE 50 MG: 50 TABLET, COATED ORAL at 02:42

## 2022-04-12 RX ADMIN — TRAMADOL HYDROCHLORIDE 50 MG: 50 TABLET, COATED ORAL at 10:03

## 2022-04-12 RX ADMIN — LEVOTHYROXINE SODIUM 100 MCG: 0.1 TABLET ORAL at 06:23

## 2022-04-12 RX ADMIN — Medication 10 ML: at 21:22

## 2022-04-12 RX ADMIN — AMLODIPINE BESYLATE 5 MG: 5 TABLET ORAL at 08:28

## 2022-04-12 RX ADMIN — METOPROLOL SUCCINATE 25 MG: 25 TABLET, EXTENDED RELEASE ORAL at 08:28

## 2022-04-12 RX ADMIN — ASPIRIN 81 MG 81 MG: 81 TABLET ORAL at 08:28

## 2022-04-12 RX ADMIN — ATORVASTATIN CALCIUM 80 MG: 40 TABLET, FILM COATED ORAL at 21:22

## 2022-04-13 ENCOUNTER — READMISSION MANAGEMENT (OUTPATIENT)
Dept: CALL CENTER | Facility: HOSPITAL | Age: 72
End: 2022-04-13

## 2022-04-13 VITALS
DIASTOLIC BLOOD PRESSURE: 51 MMHG | BODY MASS INDEX: 20.76 KG/M2 | OXYGEN SATURATION: 94 % | HEIGHT: 67 IN | SYSTOLIC BLOOD PRESSURE: 120 MMHG | HEART RATE: 57 BPM | RESPIRATION RATE: 17 BRPM | TEMPERATURE: 98.5 F | WEIGHT: 132.28 LBS

## 2022-04-13 PROCEDURE — 99232 SBSQ HOSP IP/OBS MODERATE 35: CPT | Performed by: PSYCHIATRY & NEUROLOGY

## 2022-04-13 PROCEDURE — 99239 HOSP IP/OBS DSCHRG MGMT >30: CPT | Performed by: NURSE PRACTITIONER

## 2022-04-13 RX ORDER — CEFDINIR 300 MG/1
300 CAPSULE ORAL 2 TIMES DAILY
Qty: 10 CAPSULE | Refills: 0 | Status: SHIPPED | OUTPATIENT
Start: 2022-04-13 | End: 2022-07-22

## 2022-04-13 RX ORDER — AMLODIPINE BESYLATE 5 MG/1
5 TABLET ORAL
Qty: 30 TABLET | Refills: 0 | Status: SHIPPED | OUTPATIENT
Start: 2022-04-14 | End: 2022-07-22

## 2022-04-13 RX ORDER — METOPROLOL SUCCINATE 25 MG/1
25 TABLET, EXTENDED RELEASE ORAL
Qty: 30 TABLET | Refills: 0 | Status: SHIPPED | OUTPATIENT
Start: 2022-04-14

## 2022-04-13 RX ORDER — ASPIRIN 81 MG/1
81 TABLET, CHEWABLE ORAL DAILY
Qty: 30 TABLET | Refills: 0 | Status: SHIPPED | OUTPATIENT
Start: 2022-04-14

## 2022-04-13 RX ORDER — LEVOTHYROXINE SODIUM 0.1 MG/1
100 TABLET ORAL
Qty: 30 TABLET | Refills: 0 | Status: SHIPPED | OUTPATIENT
Start: 2022-04-14 | End: 2022-07-22

## 2022-04-13 RX ADMIN — TRAMADOL HYDROCHLORIDE 50 MG: 50 TABLET, COATED ORAL at 00:26

## 2022-04-13 RX ADMIN — AMLODIPINE BESYLATE 5 MG: 5 TABLET ORAL at 08:22

## 2022-04-13 RX ADMIN — ASPIRIN 81 MG 81 MG: 81 TABLET ORAL at 08:23

## 2022-04-13 RX ADMIN — LEVOTHYROXINE SODIUM 100 MCG: 0.1 TABLET ORAL at 05:20

## 2022-04-13 RX ADMIN — Medication 10 MG: at 00:26

## 2022-04-13 RX ADMIN — METOPROLOL SUCCINATE 25 MG: 25 TABLET, EXTENDED RELEASE ORAL at 08:22

## 2022-04-14 NOTE — OUTREACH NOTE
Prep Survey    Flowsheet Row Responses   [facility] facility patient discharged from? [facility]   Is LACE score < 7 ? No   Emergency Room discharge w/ pulse ox? No   Eligibility Readm Mgmt   Discharge diagnosis AMS, UTI   Does the patient have one of the following disease processes/diagnoses(primary or secondary)? Other   Does the patient have Home health ordered? No   Is there a DME ordered? No   Prep survey completed? Yes          ABENA BAIN - Registered Nurse

## 2022-04-15 LAB
BACTERIA SPEC AEROBE CULT: NORMAL
BACTERIA SPEC AEROBE CULT: NORMAL

## 2022-04-18 ENCOUNTER — READMISSION MANAGEMENT (OUTPATIENT)
Dept: CALL CENTER | Facility: HOSPITAL | Age: 72
End: 2022-04-18

## 2022-04-18 NOTE — OUTREACH NOTE
Medical Week 1 Survey    Flowsheet Row Responses   [facility] patient discharged from? [facility]   Does the patient have one of the following disease processes/diagnoses(primary or secondary)? Other   Week 1 attempt successful? Yes   Call start time 0721   Call end time 0724   Discharge diagnosis AMS, UTI   Meds reviewed with patient/caregiver? Yes   Is the patient having any side effects they believe may be caused by any medication additions or changes? No   Does the patient have all medications ordered at discharge? Yes   Is the patient taking all medications as directed (includes completed medication regime)? Yes   Does the patient have a primary care provider?  Yes   Does the patient have an appointment with their PCP within 7 days of discharge? Yes   Comments regarding PCP She does have a appt with her PCP.    Has the patient kept scheduled appointments due by today? Yes   Has home health visited the patient within 72 hours of discharge? N/A   Psychosocial issues? No   Did the patient receive a copy of their discharge instructions? Yes   Nursing interventions Reviewed instructions with patient   What is the patient's perception of their health status since discharge? Improving   Is the patient/caregiver able to teach back signs and symptoms related to disease process for when to call PCP? Yes   Is the patient/caregiver able to teach back signs and symptoms related to disease process for when to call 911? Yes   Is the patient/caregiver able to teach back the hierarchy of who to call/visit for symptoms/problems? PCP, Specialist, Home health nurse, Urgent Care, ED, 911 Yes   If the patient is a current smoker, are they able to teach back resources for cessation? Not a smoker   Additional teach back comments She sees her PCP this week and [facility] on 05/03/2022   Week 1 call completed? Yes   Wrap up additional comments She reports she is feeling better.           MOI LARSON - Registered Nurse

## 2022-05-03 ENCOUNTER — CONSULT (OUTPATIENT)
Dept: ONCOLOGY | Facility: CLINIC | Age: 72
End: 2022-05-03

## 2022-05-03 ENCOUNTER — LAB (OUTPATIENT)
Dept: LAB | Facility: HOSPITAL | Age: 72
End: 2022-05-03

## 2022-05-03 VITALS
WEIGHT: 130 LBS | DIASTOLIC BLOOD PRESSURE: 68 MMHG | BODY MASS INDEX: 20.4 KG/M2 | HEART RATE: 97 BPM | OXYGEN SATURATION: 97 % | TEMPERATURE: 98.2 F | SYSTOLIC BLOOD PRESSURE: 143 MMHG | RESPIRATION RATE: 18 BRPM | HEIGHT: 67 IN

## 2022-05-03 DIAGNOSIS — R59.0 MEDIASTINAL LYMPHADENOPATHY: ICD-10-CM

## 2022-05-03 DIAGNOSIS — R59.0 MEDIASTINAL LYMPHADENOPATHY: Primary | ICD-10-CM

## 2022-05-03 LAB
ALBUMIN SERPL-MCNC: 4.3 G/DL (ref 3.5–5.2)
ALBUMIN/GLOB SERPL: 1.4 G/DL
ALP SERPL-CCNC: 176 U/L (ref 39–117)
ALT SERPL W P-5'-P-CCNC: 13 U/L (ref 1–33)
ANION GAP SERPL CALCULATED.3IONS-SCNC: 8 MMOL/L (ref 5–15)
AST SERPL-CCNC: 21 U/L (ref 1–32)
BILIRUB SERPL-MCNC: 0.2 MG/DL (ref 0–1.2)
BUN SERPL-MCNC: 18 MG/DL (ref 8–23)
BUN/CREAT SERPL: 20.2 (ref 7–25)
CALCIUM SPEC-SCNC: 9.4 MG/DL (ref 8.6–10.5)
CHLORIDE SERPL-SCNC: 108 MMOL/L (ref 98–107)
CO2 SERPL-SCNC: 28 MMOL/L (ref 22–29)
CREAT SERPL-MCNC: 0.89 MG/DL (ref 0.57–1)
EGFRCR SERPLBLD CKD-EPI 2021: 69.4 ML/MIN/1.73
ERYTHROCYTE [DISTWIDTH] IN BLOOD BY AUTOMATED COUNT: 15 % (ref 12.3–15.4)
GLOBULIN UR ELPH-MCNC: 3 GM/DL
GLUCOSE SERPL-MCNC: 90 MG/DL (ref 65–99)
HCT VFR BLD AUTO: 40.1 % (ref 34–46.6)
HGB BLD-MCNC: 12.2 G/DL (ref 12–15.9)
LDH SERPL-CCNC: 259 U/L (ref 135–214)
LYMPHOCYTES # BLD AUTO: 2.1 10*3/MM3 (ref 0.7–3.1)
LYMPHOCYTES NFR BLD AUTO: 19.7 % (ref 19.6–45.3)
MCH RBC QN AUTO: 28.2 PG (ref 26.6–33)
MCHC RBC AUTO-ENTMCNC: 30.5 G/DL (ref 31.5–35.7)
MCV RBC AUTO: 92.2 FL (ref 79–97)
MONOCYTES # BLD AUTO: 0.4 10*3/MM3 (ref 0.1–0.9)
MONOCYTES NFR BLD AUTO: 4.2 % (ref 5–12)
NEUTROPHILS NFR BLD AUTO: 76.1 % (ref 42.7–76)
NEUTROPHILS NFR BLD AUTO: 8.1 10*3/MM3 (ref 1.7–7)
PLATELET # BLD AUTO: 258 10*3/MM3 (ref 140–450)
PMV BLD AUTO: 8.4 FL (ref 6–12)
POTASSIUM SERPL-SCNC: 5.1 MMOL/L (ref 3.5–5.2)
PROT SERPL-MCNC: 7.3 G/DL (ref 6–8.5)
RBC # BLD AUTO: 4.35 10*6/MM3 (ref 3.77–5.28)
SODIUM SERPL-SCNC: 144 MMOL/L (ref 136–145)
WBC NRBC COR # BLD: 10.7 10*3/MM3 (ref 3.4–10.8)

## 2022-05-03 PROCEDURE — 36415 COLL VENOUS BLD VENIPUNCTURE: CPT

## 2022-05-03 PROCEDURE — 80053 COMPREHEN METABOLIC PANEL: CPT

## 2022-05-03 PROCEDURE — 99204 OFFICE O/P NEW MOD 45 MIN: CPT | Performed by: INTERNAL MEDICINE

## 2022-05-03 PROCEDURE — 83615 LACTATE (LD) (LDH) ENZYME: CPT

## 2022-05-03 PROCEDURE — 85025 COMPLETE CBC W/AUTO DIFF WBC: CPT

## 2022-05-03 RX ORDER — HYDROXYZINE PAMOATE 25 MG/1
25 CAPSULE ORAL 3 TIMES DAILY PRN
COMMUNITY
Start: 2022-04-12

## 2022-05-03 RX ORDER — BACLOFEN 10 MG/1
10 TABLET ORAL 2 TIMES DAILY PRN
COMMUNITY
Start: 2022-04-07

## 2022-05-03 RX ORDER — LORATADINE 10 MG/1
TABLET ORAL
COMMUNITY
Start: 2022-02-04

## 2022-05-03 RX ORDER — ESTRADIOL 0.04 MG/D
PATCH TRANSDERMAL
COMMUNITY
Start: 2022-04-05

## 2022-05-03 RX ORDER — ATORVASTATIN CALCIUM 40 MG/1
40 TABLET, FILM COATED ORAL
COMMUNITY
Start: 2022-04-30

## 2022-05-03 RX ORDER — CLOPIDOGREL BISULFATE 75 MG/1
75 TABLET ORAL DAILY
COMMUNITY
Start: 2022-02-25

## 2022-05-03 RX ORDER — ZOLPIDEM TARTRATE 10 MG/1
10 TABLET ORAL
Status: ON HOLD | COMMUNITY
Start: 2022-04-18 | End: 2022-07-14

## 2022-05-03 RX ORDER — LEVOTHYROXINE SODIUM 112 UG/1
112 TABLET ORAL DAILY
COMMUNITY
Start: 2022-02-25 | End: 2022-07-22

## 2022-05-03 RX ORDER — ERGOCALCIFEROL 1.25 MG/1
50000 CAPSULE ORAL
COMMUNITY
Start: 2022-02-15

## 2022-05-03 NOTE — PROGRESS NOTES
New Patient Office Visit      Date: 2022     Patient Name: Oneyda Luna  MRN: [id number]  : 1950  Referring Physician: Hospital Follow up     Chief Complaint: Establish care for mediastinal adenopathy    History of Present Illness: Oneyda Luna is a pleasant 71 y.o. female with a past medical history of hypothyroidism, tobacco abuse, anxiety, hyperlipidemia, CVA, CAD who presents today for evaluation of mediastinal adenopathy. The patient is accompanied by their  who contributes to the history of their care.  Patient was recently hospitalized for altered mental status secondary to UTI/pyelonephritis as well as concerns for a left vertebral artery dissection.  As part of her work-up, she had a CT scan of her chest which was notable for mildly enlarged calcified and noncalcified mediastinal and hilar lymph nodes measuring up to 1.8 cm in the subcarinal region.  The rest of her CT scan showed no evidence of primary lesion.  She also had an MRI of her brain which did not reveal any concerning cancerous lesions.  She is a former smoker x30 years but quit in 2017.  She denies any chest pain, shortness of breath, chest tightness.  She is overall anxious.  She states she is up-to-date on her mammograms and colonoscopies with her primary care physician.  She is compliant with other home medications    Oncology History:    Oncology/Hematology History    No history exists.       Subjective      Review of Systems:     Constitutional: Negative for fevers, chills, or weight loss  Eyes: Negative for blurred vision or discharge         Ear/Nose/Throat: Negative for difficulty swallowing, sore throat, LAD                                                       Respiratory: Negative for cough, SOA, wheezing                                                                                        Cardiovascular: Negative for chest pain or palpitations                                                                   Gastrointestinal: Negative for nausea, vomiting or diarrhea                                                                     Genitourinary: Negative for dysuria or hematuria                                                                                           Musculoskeletal: Negative for any joint pains or muscle aches                                                                        Neurologic: Negative for any weakness, headaches, dizziness                                                                         Hematologic: Negative for any easy bleeding or bruising                                                                                   Psychiatric: Negative for anxiety or depression                             Past Medical History:   Past Medical History:   Diagnosis Date   • Graves disease    • Heart disease    • Hip arthrosis    • Knee swelling        Past Surgical History:   Past Surgical History:   Procedure Laterality Date   • CARDIAC SURGERY      2017 stints   • WRIST SURGERY      2004 right wrist       Family History:   Family History   Problem Relation Age of Onset   • Osteoarthritis Mother    • Hypertension Mother    • Heart attack Mother    • Osteoarthritis Father    • Hypertension Father        Social History:   Social History     Socioeconomic History   • Marital status:    Tobacco Use   • Smoking status: Former Smoker     Years: 30.00   • Smokeless tobacco: Never Used   • Tobacco comment: Feb 2017   Substance and Sexual Activity   • Alcohol use: No   • Drug use: No   • Sexual activity: Defer       Medications:     Current Outpatient Medications:   •  amLODIPine (NORVASC) 5 MG tablet, Take 1 tablet by mouth Daily., Disp: 30 tablet, Rfl: 0  •  aspirin 81 MG chewable tablet, Chew 1 tablet Daily., Disp: 30 tablet, Rfl: 0  •  atorvastatin (LIPITOR) 40 MG tablet, Take 40 mg by mouth every night at bedtime., Disp: , Rfl:   •  baclofen (LIORESAL) 10 MG tablet, Take 10 mg by  "mouth 2 (Two) Times a Day As Needed., Disp: , Rfl:   •  cefdinir (OMNICEF) 300 MG capsule, Take 1 capsule by mouth 2 (Two) Times a Day., Disp: 10 capsule, Rfl: 0  •  Cholecalciferol (VITAMIN D3) 5000 units capsule capsule, Take 5,000 Units by mouth Daily., Disp: , Rfl:   •  clopidogrel (PLAVIX) 75 MG tablet, Take 75 mg by mouth Daily., Disp: , Rfl:   •  estradiol (CLIMARA) 0.0375 MG/24HR, APPLY 1 PATCH TOPICALLY ONCE A WEEK AS DIRECTED, Disp: , Rfl:   •  estrogens, conjugated, (PREMARIN) 0.3 MG tablet, Take 0.3 mg by mouth Daily. Take daily for 21 days then do not take for 7 days., Disp: , Rfl:   •  gabapentin (NEURONTIN) 300 MG capsule, Take 300 mg by mouth 3 (Three) Times a Day., Disp: , Rfl:   •  HM Loratadine 10 MG tablet, TAKE ONE TABLET BY MOUTH EVERY MORNING AS DIRECTED, Disp: , Rfl:   •  hydrOXYzine pamoate (VISTARIL) 25 MG capsule, Take 25 mg by mouth 3 (Three) Times a Day As Needed., Disp: , Rfl:   •  ibuprofen (ADVIL,MOTRIN) 800 MG tablet, Take 800 mg by mouth Every 6 (Six) Hours As Needed for Mild Pain ., Disp: , Rfl:   •  levothyroxine (SYNTHROID, LEVOTHROID) 100 MCG tablet, Take 1 tablet by mouth Every Morning., Disp: 30 tablet, Rfl: 0  •  levothyroxine (SYNTHROID, LEVOTHROID) 112 MCG tablet, Take 112 mcg by mouth Daily., Disp: , Rfl:   •  metoprolol succinate XL (TOPROL-XL) 25 MG 24 hr tablet, Take 1 tablet by mouth Daily., Disp: 30 tablet, Rfl: 0  •  vitamin D (ERGOCALCIFEROL) 1.25 MG (18357 UT) capsule capsule, Take 50,000 Units by mouth Every 7 (Seven) Days.  , Disp: , Rfl:   •  zolpidem (AMBIEN) 10 MG tablet, Take 10 mg by mouth every night at bedtime., Disp: , Rfl:     Allergies:   Allergies   Allergen Reactions   • Molds & Smuts Other (See Comments)     Sneezing and congestion         Objective     Physical Exam:  Vital Signs:   Vitals:    05/03/22 1353   BP: 143/68   Pulse: 97   Resp: 18   Temp: 98.2 °F (36.8 °C)   SpO2: 97%   Weight: 59 kg (130 lb)   Height: 170.2 cm (67\")   PainSc: 0-No " pain  Comment: PT is very anxious     Pain Score    05/03/22 1353   PainSc: 0-No pain  Comment: PT is very anxious     ECOG Performance Status: 0 - Asymptomatic    Constitutional: NAD, ECOG 0  Eyes: PERRLA, scleral anicteric  ENT: No LAD, no thyromegaly  Respiratory: CTAB, no wheezing, rales, rhonchi  Cardiovascular: RRR, no murmurs, pulses 2+ bilaterally  Abdomen: soft, NT/ND, no HSM  Musculoskeletal: strength 5/5 bilaterally, no c/c/e  Neurologic: A&O x 3, CN II-XII intact grossly  Psych: mood and affect congruent, no SI or HI    Results Review:   No visits with results within 2 Week(s) from this visit.   Latest known visit with results is:   Admission on 04/09/2022, Discharged on 04/13/2022   Component Date Value Ref Range Status   • Glucose 04/09/2022 77  65 - 99 mg/dL Final   • BUN 04/09/2022 12  8 - 23 mg/dL Final   • Creatinine 04/09/2022 0.85  0.57 - 1.00 mg/dL Final   • Sodium 04/09/2022 141  136 - 145 mmol/L Final   • Potassium 04/09/2022 3.5  3.5 - 5.2 mmol/L Final   • Chloride 04/09/2022 105  98 - 107 mmol/L Final   • CO2 04/09/2022 26.0  22.0 - 29.0 mmol/L Final   • Calcium 04/09/2022 9.6  8.6 - 10.5 mg/dL Final   • Total Protein 04/09/2022 7.3  6.0 - 8.5 g/dL Final   • Albumin 04/09/2022 4.20  3.50 - 5.20 g/dL Final   • ALT (SGPT) 04/09/2022 11  1 - 33 U/L Final   • AST (SGOT) 04/09/2022 18  1 - 32 U/L Final   • Alkaline Phosphatase 04/09/2022 178 (A) 39 - 117 U/L Final   • Total Bilirubin 04/09/2022 0.3  0.0 - 1.2 mg/dL Final   • Globulin 04/09/2022 3.1  gm/dL Final    Calculated Result   • A/G Ratio 04/09/2022 1.4  g/dL Final   • BUN/Creatinine Ratio 04/09/2022 14.1  7.0 - 25.0 Final   • Anion Gap 04/09/2022 10.0  5.0 - 15.0 mmol/L Final   • eGFR 04/09/2022 73.4  >60.0 mL/min/1.73 Final    National Kidney Foundation and American Society of Nephrology (ASN) Task Force recommended calculation based on the Chronic Kidney Disease Epidemiology Collaboration (CKD-EPI) equation refit without adjustment for  race.   • Extra Tube 04/09/2022 Hold for add-ons.   Final    Auto resulted.   • Extra Tube 04/09/2022 hold for add-on   Final    Auto resulted   • Extra Tube 04/09/2022 Hold for add-ons.   Final    Auto resulted.   • Extra Tube 04/09/2022 Hold for add-ons.   Final    Auto resulted.   • Extra Tube 04/09/2022 hold for add-on   Final    Auto resulted   • WBC 04/09/2022 8.37  3.40 - 10.80 10*3/mm3 Final   • RBC 04/09/2022 3.85  3.77 - 5.28 10*6/mm3 Final   • Hemoglobin 04/09/2022 11.5 (A) 12.0 - 15.9 g/dL Final   • Hematocrit 04/09/2022 35.4  34.0 - 46.6 % Final   • MCV 04/09/2022 91.9  79.0 - 97.0 fL Final   • MCH 04/09/2022 29.9  26.6 - 33.0 pg Final   • MCHC 04/09/2022 32.5  31.5 - 35.7 g/dL Final   • RDW 04/09/2022 13.5  12.3 - 15.4 % Final   • RDW-SD 04/09/2022 45.6  37.0 - 54.0 fl Final   • MPV 04/09/2022 10.5  6.0 - 12.0 fL Final   • Platelets 04/09/2022 285  140 - 450 10*3/mm3 Final   • Neutrophil % 04/09/2022 46.9  42.7 - 76.0 % Final   • Lymphocyte % 04/09/2022 26.3  19.6 - 45.3 % Final   • Monocyte % 04/09/2022 13.3 (A) 5.0 - 12.0 % Final   • Eosinophil % 04/09/2022 12.1 (A) 0.3 - 6.2 % Final   • Basophil % 04/09/2022 1.2  0.0 - 1.5 % Final   • Immature Grans % 04/09/2022 0.2  0.0 - 0.5 % Final   • Neutrophils, Absolute 04/09/2022 3.93  1.70 - 7.00 10*3/mm3 Final   • Lymphocytes, Absolute 04/09/2022 2.20  0.70 - 3.10 10*3/mm3 Final   • Monocytes, Absolute 04/09/2022 1.11 (A) 0.10 - 0.90 10*3/mm3 Final   • Eosinophils, Absolute 04/09/2022 1.01 (A) 0.00 - 0.40 10*3/mm3 Final   • Basophils, Absolute 04/09/2022 0.10  0.00 - 0.20 10*3/mm3 Final   • Immature Grans, Absolute 04/09/2022 0.02  0.00 - 0.05 10*3/mm3 Final   • nRBC 04/09/2022 0.0  0.0 - 0.2 /100 WBC Final   • Troponin T 04/09/2022 <0.010  0.000 - 0.030 ng/mL Final   • TSH 04/09/2022 0.100 (A) 0.270 - 4.200 uIU/mL Final   • Free T4 04/09/2022 1.44  0.93 - 1.70 ng/dL Final   • Magnesium 04/09/2022 2.0  1.6 - 2.4 mg/dL Final   • COVID19 04/09/2022  Presumptive Negative  Presumptive Negative - Ref. Range Final   • T3, Free 04/09/2022 3.14  2.00 - 4.40 pg/mL Final   • Color, UA 04/10/2022 Yellow  Yellow, Straw Final   • Appearance, UA 04/10/2022 Clear  Clear Final   • pH, UA 04/10/2022 6.5  5.0 - 8.0 Final   • Specific Gravity, UA 04/10/2022 1.058 (A) 1.001 - 1.030 Final   • Glucose, UA 04/10/2022 Negative  Negative Final   • Ketones, UA 04/10/2022 Negative  Negative Final   • Bilirubin, UA 04/10/2022 Negative  Negative Final   • Blood, UA 04/10/2022 Negative  Negative Final   • Protein, UA 04/10/2022 Negative  Negative Final   • Leuk Esterase, UA 04/10/2022 Small (1+) (A) Negative Final   • Nitrite, UA 04/10/2022 Negative  Negative Final   • Urobilinogen, UA 04/10/2022 0.2 E.U./dL  0.2 - 1.0 E.U./dL Final   • THC, Screen, Urine 04/10/2022 Negative  Negative Final   • Phencyclidine (PCP), Urine 04/10/2022 Negative  Negative Final   • Cocaine Screen, Urine 04/10/2022 Negative  Negative Final   • Methamphetamine, Ur 04/10/2022 Negative  Negative Final   • Opiate Screen 04/10/2022 Negative  Negative Final   • Amphetamine Screen, Urine 04/10/2022 Positive (A) Negative Final   • Benzodiazepine Screen, Urine 04/10/2022 Negative  Negative Final   • Tricyclic Antidepressants Screen 04/10/2022 Negative  Negative Final   • Methadone Screen, Urine 04/10/2022 Negative  Negative Final   • Barbiturates Screen, Urine 04/10/2022 Negative  Negative Final   • Oxycodone Screen, Urine 04/10/2022 Negative  Negative Final   • Propoxyphene Screen 04/10/2022 Negative  Negative Final   • Buprenorphine, Screen, Urine 04/10/2022 Negative  Negative Final   • Ammonia 04/10/2022 22  11 - 51 umol/L Final   • Procalcitonin 04/09/2022 <0.20  0.00 - 0.25 ng/mL Final   • Blood Culture 04/10/2022 No growth at 5 days   Final   • Blood Culture 04/10/2022 No growth at 5 days   Final   • Creatine Kinase 04/09/2022 74  20 - 180 U/L Final   • Lactate 04/10/2022 0.8  0.5 - 2.0 mmol/L Final    Falsely  depressed results may occur on samples drawn from patients receiving N-Acetylcysteine (NAC) or Metamizole.   • Hemoglobin A1C 04/10/2022 5.60  4.80 - 5.60 % Final   • Total Cholesterol 04/10/2022 155  0 - 200 mg/dL Final   • Triglycerides 04/10/2022 71  0 - 150 mg/dL Final   • HDL Cholesterol 04/10/2022 68 (A) 40 - 60 mg/dL Final   • LDL Cholesterol  04/10/2022 73  0 - 100 mg/dL Final   • VLDL Cholesterol 04/10/2022 14  5 - 40 mg/dL Final   • LDL/HDL Ratio 04/10/2022 1.07   Final   • BSA 04/10/2022 1.7  m^2 Final   • IVSd 04/10/2022 0.87  cm Final   • LVIDd 04/10/2022 4.0  cm Final   • LVIDs 04/10/2022 2.8  cm Final   • LVPWd 04/10/2022 0.86  cm Final   • IVS/LVPW 04/10/2022 1.0   Final   • FS 04/10/2022 31.1  % Final   • EDV(Teich) 04/10/2022 69.9  ml Final   • ESV(Teich) 04/10/2022 28.4  ml Final   • EF(Teich) 04/10/2022 59.4  % Final   • EDV(cubed) 04/10/2022 63.9  ml Final   • ESV(cubed) 04/10/2022 20.9  ml Final   • EF(cubed) 04/10/2022 67.3  % Final   • LV mass(C)d 04/10/2022 103.8  grams Final   • LV mass(C)dI 04/10/2022 61.9  grams/m^2 Final   • SV(Teich) 04/10/2022 41.5  ml Final   • SI(Teich) 04/10/2022 24.8  ml/m^2 Final   • SV(cubed) 04/10/2022 43.0  ml Final   • SI(cubed) 04/10/2022 25.6  ml/m^2 Final   • Ao root diam 04/10/2022 2.9  cm Final   • Ao root area 04/10/2022 6.7  cm^2 Final   • LA dimension 04/10/2022 3.4  cm Final   • asc Aorta Diam 04/10/2022 2.4  cm Final   • LA/Ao 04/10/2022 1.2   Final   • LVOT diam 04/10/2022 1.9  cm Final   • LVOT area 04/10/2022 2.8  cm^2 Final   • LVOT area(traced) 04/10/2022 2.8  cm^2 Final   • LAd major 04/10/2022 3.8  cm Final   • LVLd ap4 04/10/2022 6.2  cm Final   • EDV(MOD-sp4) 04/10/2022 41.0  ml Final   • LVLs ap4 04/10/2022 4.7  cm Final   • ESV(MOD-sp4) 04/10/2022 18.0  ml Final   • EF(MOD-sp4) 04/10/2022 56.1  % Final   • LVLd ap2 04/10/2022 6.6  cm Final   • EDV(MOD-sp2) 04/10/2022 42.0  ml Final   • LVLs ap2 04/10/2022 5.1  cm Final   • ESV(MOD-sp2)  04/10/2022 17.0  ml Final   • EF(MOD-sp2) 04/10/2022 59.5  % Final   • LA volume 04/10/2022 41.0  ml Final   • EF(MOD-bp) 04/10/2022 58.0  % Final   • SV(MOD-sp4) 04/10/2022 23.0  ml Final   • SI(MOD-sp4) 04/10/2022 13.7  ml/m^2 Final   • SV(MOD-sp2) 04/10/2022 25.0  ml Final   • SI(MOD-sp2) 04/10/2022 14.9  ml/m^2 Final   • Ao root area (BSA corrected) 04/10/2022 1.7   Final   • LV Echeverria Vol (BSA corrected) 04/10/2022 24.5  ml/m^2 Final   • LV Sys Vol (BSA corrected) 04/10/2022 10.7  ml/m^2 Final   • LA Volume Index 04/10/2022 24.5  ml/m^2 Final   • MV E max donovan 04/10/2022 87.9  cm/sec Final   • MV A max donovan 04/10/2022 115.7  cm/sec Final   • MV E/A 04/10/2022 0.76   Final   • MV P1/2t max donovan 04/10/2022 89.5  cm/sec Final   • MV P1/2t 04/10/2022 53.0  msec Final   • MVA(P1/2t) 04/10/2022 4.1  cm^2 Final   • MV dec slope 04/10/2022 494.2  cm/sec^2 Final   • MV dec time 04/10/2022 0.24  sec Final   • Ao pk donovan 04/10/2022 157.5  cm/sec Final   • Ao max PG 04/10/2022 9.9  mmHg Final   • Ao max PG (full) 04/10/2022 4.9  mmHg Final   • Ao V2 mean 04/10/2022 88.4  cm/sec Final   • Ao mean PG 04/10/2022 3.9  mmHg Final   • Ao mean PG (full) 04/10/2022 1.4  mmHg Final   • Ao V2 VTI 04/10/2022 24.3  cm Final   • SHRADDHA(I,A) 04/10/2022 1.9  cm^2 Final   • SHRADDHA(I,D) 04/10/2022 1.9  cm^2 Final   • SHRADDHA(V,A) 04/10/2022 2.0  cm^2 Final   • SHRADDHA(V,D) 04/10/2022 2.0  cm^2 Final   • LV V1 max PG 04/10/2022 5.0  mmHg Final   • LV V1 mean PG 04/10/2022 2.5  mmHg Final   • LV V1 max 04/10/2022 111.8  cm/sec Final   • LV V1 mean 04/10/2022 72.4  cm/sec Final   • LV V1 VTI 04/10/2022 16.1  cm Final   • SV(Ao) 04/10/2022 163.5  ml Final   • SI(Ao) 04/10/2022 97.5  ml/m^2 Final   • SV(LVOT) 04/10/2022 45.7  ml Final   • SI(LVOT) 04/10/2022 27.3  ml/m^2 Final   • PA acc slope 04/10/2022 1,038  cm/sec^2 Final   • PA acc time 04/10/2022 0.1  sec Final   • PI end-d donovan 04/10/2022 145.2  cm/sec Final   • TR max donovan 04/10/2022 210.4  cm/sec Final   •  TR max PG 04/10/2022 17.7  mmHg Final   • RVSP(TR) 04/10/2022 20.7  mmHg Final   • RAP systole 04/10/2022 3.0  mmHg Final   • PA pr(Accel) 04/10/2022 33.8  mmHg Final   • MVA P1/2T LCG 04/10/2022 2.5  cm^2 Final   • Lat E/e'  04/10/2022 8.5   Final   • Med E/e' 04/10/2022 10.3   Final   • Lat Peak E' Timmy 04/10/2022 10.1  cm/sec Final   • Med Peak E' Timmy 04/10/2022 8.3  cm/sec Final   • BH CV ECHO KEE - BZI_BMI 04/10/2022 21.3  kilograms/m^2 Final   • BH CV ECHO KEE - BSA([place]) 04/10/2022 1.7  m^2 Final   • BH CV ECHO KEE - BZI_METRIC_WEIGHT 04/10/2022 59.9  kg Final   • BH CV ECHO KEE - BZI_METRIC_HEIGHT 04/10/2022 167.6  cm Final   • Avg E/e' ratio 04/10/2022 9.55   Final   • RBC, UA 04/10/2022 0-2  None Seen, 0-2 /HPF Final   • WBC, UA 04/10/2022 Too Numerous to Count (A) None Seen, 0-2 /HPF Final   • Bacteria, UA 04/10/2022 4+ (A) None Seen, Trace /HPF Final   • Squamous Epithelial Cells, UA 04/10/2022 None Seen  None Seen, 0-2 /HPF Final   • Hyaline Casts, UA 04/10/2022 None Seen  0 - 6 /LPF Final   • Methodology 04/10/2022 Automated Microscopy   Final   • Urine Culture 04/10/2022 >100,000 CFU/mL Klebsiella pneumoniae ssp pneumoniae (A)  Final   • Glucose 04/10/2022 85  70 - 130 mg/dL Final    Meter: [id number] : [id number] Shaye Mendiola   • Glucose 04/10/2022 123  70 - 130 mg/dL Final    Meter: [id number] : [id number] Tony Greenwood   • Glucose 04/10/2022 111  70 - 130 mg/dL Final    Meter: [id number] : [id number] Tony Arsalinas   • Glucose 04/11/2022 85  70 - 130 mg/dL Final    Meter: [id number] : [id number] Osiris Sun   • Glucose 04/12/2022 92  70 - 130 mg/dL Final    Meter: [id number] : [id number] Milena Lara   • Glucose 04/12/2022 93  70 - 130 mg/dL Final    Meter: [id number] : [id number] Milena Lara   • Glucose 04/12/2022 129  70 - 130 mg/dL Final    Meter: [id number] : [id number] Milena Lara       Adult Transthoracic Echo Complete W/ Cont if Necessary Per Protocol  (With Agitated Saline)    Result Date: 4/11/2022  Narrative: · LV SF is normal · Mild TR · No pericardial effusion      EEG    Result Date: 4/11/2022  Narrative: Reason for referral: 71 y.o.female with jerking spells, altered mental status, consideration of seizures Technical Summary:  A 19 channel digital EEG was performed using the international 10-20 placement system, including eye leads and EKG leads. Duration: 20 minutes Findings: The awake tracing shows diffuse low to medium amplitude 6-10 Hz intermixed theta and alpha activity which is present symmetrically over both hemispheres.  A clear posterior rhythm is not seen.  EMG artifact is variably prominent over the anterior leads.  As the study proceeds, occasional brief bursts of medium amplitude 5 Hz theta are seen over both hemispheres.  Stage II sleep is not seen.  Photic stimulation yields a symmetric driving response.  Hyperventilation is not performed.  No focal features or epileptiform activity are seen. Video: On Technical quality: Good EKG: Regular, 70 bpm SUMMARY: Mild intermittent generalized slow No focal features or epileptiform activity are seen     Impression: Diffuse cerebral dysfunction of mild degree, nonspecific No evidence for epilepsy is seen This report is transcribed using the Dragon dictation system.      CT Angiogram Neck    Addendum Date: 4/9/2022 Addendum:   These results were communicated to ELIZABETH PINA at 4/9/2022 9:21 PM Mountain Time. Electronically signed by:  Tyler Magdaleno DO  4/9/2022 9:21 PM    Result Date: 4/9/2022  Narrative: EXAMINATION: CT HEAD WO CONTRAST STROKE PROTOCOL, CT ANGIOGRAM HEAD W AI ANALYSIS OF LVO, CT ANGIOGRAM NECK DATE: 4/9/2022 10:07 PM  INDICATION: Altered mental status. History of intracranial hemorrhage.  COMPARISON: None available.  TECHNIQUE: Thin section noncontrast axial images were obtained through the head. CT angiography through the head and neck was performed in the axial plane using 85 mL  of Isovue-370 administered intravenously, without adverse reaction. Coronal and sagittal MIP and MPR images were generated. AI analysis of LVO was utilized. Percent stenoses are reported by NASCET criteria. CT dose lowering techniques were used, to include: automated exposure control, adjustment for patient size, and or use of iterative reconstruction. FINDINGS: CT HEAD: No hemorrhage or extra-axial fluid collection. No CT evidence of acute ischemia. No intracranial mass or mass effect. Small to moderate-sized area of encephalomalacia in the right parietal lobe, likely a chronic infarct. Age-appropriate parenchymal volume. No hydrocephalus. Basal cisterns are patent. Orbital structures are unremarkable. Calvarium is intact. CTA NECK: Motion artifact at the level of the mandible/carotid bifurcations. Within these confines: Aorta: Visualized aortic arch and great vessel origins are patent. Right Carotid: Common carotid, internal carotid, and external carotid arteries are patent without significant (50% or greater) stenosis or evidence of dissection.  Mild atherosclerosis of the right carotid bulb without significant (50% or greater) stenosis. Left Carotid: Common carotid, internal carotid, and external carotid arteries are patent without significant (50% or greater) stenosis or evidence of dissection.  Mild atherosclerosis of the left carotid bulb without significant (50% or greater) stenosis. Right Vertebral: No significant stenosis, occlusion, or dissection. Left Vertebral: The left V1 and proximal V2 segments of the vertebral artery are occluded. There is irregular and intermittent reconstitution of the left mid V2 segment. The more distal V2 segment and V3 segments are patent. 1.5 cm venous varix at the junction of the right internal jugular vein and right brachiocephalic vein. CTA HEAD: Anterior circulation Bilateral internal carotid, anterior cerebral, and middle cerebral arteries are normal in caliber, without  occlusion, significant stenosis, or aneurysmal dilation. Posterior circulation The vertebrobasilar system, superior cerebellar arteries, and posterior cerebral arteries are normal in caliber, without occlusion, significant stenosis, or aneurysmal dilation. Dural sinuses are grossly patent. NECK OSSEOUS AND SOFT TISSUE STRUCTURES: Visualized prominent soft tissue density just ventral to the neal measuring 1.9 cm is likely a subcarinal lymph node. Moderate emphysema, most conspicuous in the right upper lung. Calcific density just ventral to the dens inferior to the anterior arch of C1 likely represent sequelae of prior calcific tendinitis.  Paranasal sinuses are essentially clear.  Mastoid air cells are essentially clear.  C5-C7 ACDF.     Impression: CT head: 1.  No acute intracranial abnormality. No acute territorial infarct. No acute intracranial hemorrhage. MRI is more sensitive for acute ischemia. 2.  Small to moderate-sized area of encephalomalacia in the right parietal lobe, likely a chronic infarct. CTA head and neck: 1.  Occlusion of the proximal left vertebral artery with irregular and intermittent reconstitution of the mid left V2 segment. The distal left V2 and V3 segments are patent. Findings are concerning for an age-indeterminate dissection of the proximal left  vertebral artery. 2.  Within the confines of motion artifact at the level of the carotid bifurcations: Mild atherosclerosis of both carotid bulbs without significant (50% or greater) stenosis. 3.  1.5 cm venous varix at the junction of the right internal jugular vein and right brachiocephalic vein. 4.  Prominent soft tissue density just ventral to the neal measuring 1.9 cm in transverse dimension is likely an enlarged subcarinal lymph node. Suggest a CT of the chest, not necessarily emergently. 5.  Moderate emphysema. Electronically signed by:  Tyler Magdaleno DO  4/9/2022 9:16 PM Mountain Time    CT Chest With Contrast Diagnostic    Result  Date: 4/10/2022  Narrative: EXAMINATION:  CT CHEST, ABDOMEN, AND PELVIS WITH  IV CONTRAST   INDICATION: Altered mental status, abnormal radiograph, vomiting COMPARISON: Chest radiograph, CTA neck from today PROCEDURE:  Multiple axial CT images were obtained of the chest, abdomen, and pelvis after IV administration of contrast.   Coronal and sagittal reformations were also obtained. CT dose lowering techniques were used, to include: automated exposure control, adjustment for patient size, and or use of iterative reconstruction. FINDINGS: CHEST: Cardiovascular: No filling defects are seen in the pulmonary arteries. No focal aortic aneurysm or evidence of aortic dissection is seen. A venous varix at the junction of the SVC and right brachiocephalic vein is noted. Please see the dedicated CTA neck  from today. Atherosclerotic calcifications are seen in the aorta and branching vessels including in the coronary arteries. There are cardiac leads in the right ventricle and right atrial appendage. Mediastinum/Faby: There are multiple calcified and noncalcified mediastinal and hilar lymph nodes. The largest of these is in the subcarinal region measuring up to 1.9 cm in diameter. Lungs/Pleura :  No focal consolidation, pneumothorax, or pleural effusion seen. There are diffuse centrilobular emphysematous changes bilaterally. There is a calcified right lung base nodule. Chest wall and Axilla: There is a left anterior chest wall cardiac device. Bones of the chest:  No acute focal bony abnormality seen. ABDOMEN:  Liver:  The liver is homogenous without visible focal lesion.  No intrahepatic biliary ductal dilatation is seen. Gallbladder:  The gallbladder is without radiodense stones or focal abnormality. Spleen:  Multiple small calcifications. Pancreas:  No focal lesion or pancreatic ductal dilatation. Adrenal glands:  Normal in appearance. Kidneys and ureters:  No hydronephrosis. There are small bilateral renal cysts. In addition  to the cysts, there is a lobulated relatively low-attenuation cortically-based lower pole right renal lesion measuring approximately 1.6 x 1.4 x 1.9 cm in diameter. This measures higher in attenuation than a simple cyst. There are additional areas of ill-defined low-attenuation/poor contrast enhancement in the renal parenchyma bilaterally, particularly on the left. Gastrointestinal tract:  No bowel obstruction. Normal stool burden. Likely prior appendectomy. Abdominal aorta/Inferior vena cava:  No focal aneurysm seen.  IVC normal Abdominal wall: Unremarkable. PELVIS: Urinary bladder: Without focal lesion or wall thickening.  No stones seen. Reproductive organs: No acute abnormality seen by CT. No free air or significant free fluid seen. BONES OF THE ABDOMEN AND PELVIS:  No acute focal bony abnormality seen.     Impression: Areas of ill-defined low-attenuation/poor contrast enhancement in the renal parenchyma bilaterally, particularly on the left. This appearance could be seen with pyelonephritis. Areas of renal infarct would also be possible. Correlation with urinalysis and renal laboratory markers recommended. Relatively low-attenuation lobulated lower pole right renal lesion measuring up to 1.9 cm in diameter. This lesion measures higher in attenuation than a simple cyst and could be a complex cyst or solid lesion. Further evaluation of this with renal ultrasound is recommended. Multiple calcified and noncalcified mediastinal and hilar lymph nodes measuring up to 1.9 cm in diameter in the subcarinal region. These are nonspecific and may be reactive or due to prior granulomatous disease, though neoplastic involvement is not entirely excluded. Short interval follow-up CT is recommended in 2 months for reevaluation of these. Diffuse centrilobular emphysematous changes. Atherosclerosis including coronary calcifications. 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 Please see the dedicated  neck CTA report regarding this. Electronically signed by:  Abundio Mack M.D.  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  Comparison: CT 4/9/2022  Technique: Multiplanar multisequence images of the brain were performed without contrast according to routine brain MRI protocol.  FINDINGS: No restricted diffusion to suggest acute or subacute infarct.  No intracranial mass, midline shift, intracranial hemorrhage, or pathologic extraaxial fluid collection.  The ventricular system is nondilated. Brain volume is normal for patient's age.  The gray and white matter signal characteristics are normal. There is a moderate-sized area of FLAIR signal change and gliosis is present within the right parietal lobe related to previous infarct. Central SWI signal changes are present likely representing the core of infarction. Mild periventricular and subcortical FLAIR signal changes are present. The major intracranial flow voids are maintained.  The mastoid air cells and paranasal sinuses are clear. The globes and extraocular muscles are normal.  No cerebellar pontine angle masses.  Craniocervical junction is normal.  Pituitary gland has normal size and signal intensity for patient's age and gender.       Impression:  1. No evidence of hemorrhage, mass effect or midline shift. No evidence of recent or acute ischemia. 2. Moderate size area of FLAIR signal change present within the right parietal lobe with central SWI signal likely related to previous infarct and surrounding gliosis. Correlate with history of previous infarct. Given the lack of significant  encephalomalacia or volume loss present within this region, comparison to previous outside imaging is recommended as a small underlying lesion with surrounding vasogenic edema cannot be excluded. Postcontrast imaging may be warranted. 3. Mild periventricular and subcortical FLAIR signal changes likely related to chronic microvascular ischemic change.   This report was finalized on 4/13/2022 8:28 AM by Rafaela Trent MD.      CT Abdomen Pelvis With Contrast    Result Date: 4/10/2022  Narrative: EXAMINATION:  CT CHEST, ABDOMEN, AND PELVIS WITH  IV CONTRAST   INDICATION: Altered mental status, abnormal radiograph, vomiting COMPARISON: Chest radiograph, CTA neck from today PROCEDURE:  Multiple axial CT images were obtained of the chest, abdomen, and pelvis after IV administration of contrast.   Coronal and sagittal reformations were also obtained. CT dose lowering techniques were used, to include: automated exposure control, adjustment for patient size, and or use of iterative reconstruction. FINDINGS: CHEST: Cardiovascular: No filling defects are seen in the pulmonary arteries. No focal aortic aneurysm or evidence of aortic dissection is seen. A venous varix at the junction of the SVC and right brachiocephalic vein is noted. Please see the dedicated CTA neck  from today. Atherosclerotic calcifications are seen in the aorta and branching vessels including in the coronary arteries. There are cardiac leads in the right ventricle and right atrial appendage. Mediastinum/Faby: There are multiple calcified and noncalcified mediastinal and hilar lymph nodes. The largest of these is in the subcarinal region measuring up to 1.9 cm in diameter. Lungs/Pleura :  No focal consolidation, pneumothorax, or pleural effusion seen. There are diffuse centrilobular emphysematous changes bilaterally. There is a calcified right lung base nodule. Chest wall and Axilla: There is a left anterior chest wall cardiac device. Bones of the chest:  No  acute focal bony abnormality seen. ABDOMEN:  Liver:  The liver is homogenous without visible focal lesion.  No intrahepatic biliary ductal dilatation is seen. Gallbladder:  The gallbladder is without radiodense stones or focal abnormality. Spleen:  Multiple small calcifications. Pancreas:  No focal lesion or pancreatic ductal dilatation. Adrenal glands:  Normal in appearance. Kidneys and ureters:  No hydronephrosis. There are small bilateral renal cysts. In addition to the cysts, there is a lobulated relatively low-attenuation cortically-based lower pole right renal lesion measuring approximately 1.6 x 1.4 x 1.9 cm in diameter. This measures higher in attenuation than a simple cyst. There are additional areas of ill-defined low-attenuation/poor contrast enhancement in the renal parenchyma bilaterally, particularly on the left. Gastrointestinal tract:  No bowel obstruction. Normal stool burden. Likely prior appendectomy. Abdominal aorta/Inferior vena cava:  No focal aneurysm seen.  IVC normal Abdominal wall: Unremarkable. PELVIS: Urinary bladder: Without focal lesion or wall thickening.  No stones seen. Reproductive organs: No acute abnormality seen by CT. No free air or significant free fluid seen. BONES OF THE ABDOMEN AND PELVIS:  No acute focal bony abnormality seen.     Impression: Areas of ill-defined low-attenuation/poor contrast enhancement in the renal parenchyma bilaterally, particularly on the left. This appearance could be seen with pyelonephritis. Areas of renal infarct would also be possible. Correlation with urinalysis and renal laboratory markers recommended. Relatively low-attenuation lobulated lower pole right renal lesion measuring up to 1.9 cm in diameter. This lesion measures higher in attenuation than a simple cyst and could be a complex cyst or solid lesion. Further evaluation of this with renal ultrasound is recommended. Multiple calcified and noncalcified mediastinal and hilar lymph nodes  measuring up to 1.9 cm in diameter in the subcarinal region. These are nonspecific and may be reactive or due to prior granulomatous disease, though neoplastic involvement is not entirely excluded. Short interval follow-up CT is recommended in 2 months for reevaluation of these. Diffuse centrilobular emphysematous changes. Atherosclerosis including coronary calcifications. 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 Please see the dedicated neck CTA report regarding this. Electronically signed by:  Abundio Mack M.D.  4/9/2022 11:00 PM Mountain Time    XR Chest 1 View    Result Date: 4/9/2022  Narrative: Single portable chest radiograph INDICATION:  Altered mental status, 1 day COMPARISON:  None. FINDINGS: Calcified granulomas. Dense retrocardiac opacity, not well evaluated. No pleural effusion, or pneumothorax. The cardiomediastinal silhouette is nonenlarged. Cardiac pacemaker. No acute focal bony abnormalities are seen. Cervical fusion hardware.     Impression: Dense retrocardiac opacity, not well evaluated, may reflect atelectasis or pneumonia, other etiologies not excluded. Electronically signed by:  Nanci Moon M.D.  4/9/2022 9:43 PM Mountain Time    US Renal Bilateral    Result Date: 4/10/2022  Narrative: Examination: US RENAL BILATERAL-  Date of Exam: 4/10/2022 8:24 AM  Indication: Renal cyst.  Comparison: CT abdomen pelvis 04/10/2022.  Technique: Grayscale and color Doppler ultrasound evaluation of the kidneys and urinary bladder was performed  Findings: Simple appearing right renal cysts measuring up to 1.6 cm in diameter. Both kidneys are otherwise unremarkable in ultrasound appearance. No solid renal mass, shadowing stone, or hydronephrosis. The right kidney measures 10 cm in length. Left kidney measures 8.7 cm in length. Limited imaging of the urinary bladder is unremarkable.      Impression:  1. Simple appearing right renal cysts. 2. Otherwise, unremarkable bilateral  renal ultrasound. Could consider dedicated MRI with and without contrast if there is persistent clinical concern.  This report was finalized on 4/10/2022 11:02 AM by Everton Cuello MD.      CT Head Without Contrast Stroke Protocol    Addendum Date: 4/9/2022 Addendum:   These results were communicated to ELIZABETH PINA at 4/9/2022 9:21 PM Mountain Time. Electronically signed by:  Tyler Magdaleno DO  4/9/2022 9:21 PM    Result Date: 4/9/2022  Narrative: EXAMINATION: CT HEAD WO CONTRAST STROKE PROTOCOL, CT ANGIOGRAM HEAD W AI ANALYSIS OF LVO, CT ANGIOGRAM NECK DATE: 4/9/2022 10:07 PM  INDICATION: Altered mental status. History of intracranial hemorrhage.  COMPARISON: None available.  TECHNIQUE: Thin section noncontrast axial images were obtained through the head. CT angiography through the head and neck was performed in the axial plane using 85 mL of Isovue-370 administered intravenously, without adverse reaction. Coronal and sagittal MIP and MPR images were generated. AI analysis of LVO was utilized. Percent stenoses are reported by NASCET criteria. CT dose lowering techniques were used, to include: automated exposure control, adjustment for patient size, and or use of iterative reconstruction. FINDINGS: CT HEAD: No hemorrhage or extra-axial fluid collection. No CT evidence of acute ischemia. No intracranial mass or mass effect. Small to moderate-sized area of encephalomalacia in the right parietal lobe, likely a chronic infarct. Age-appropriate parenchymal volume. No hydrocephalus. Basal cisterns are patent. Orbital structures are unremarkable. Calvarium is intact. CTA NECK: Motion artifact at the level of the mandible/carotid bifurcations. Within these confines: Aorta: Visualized aortic arch and great vessel origins are patent. Right Carotid: Common carotid, internal carotid, and external carotid arteries are patent without significant (50% or greater) stenosis or evidence of dissection.  Mild atherosclerosis of the  right carotid bulb without significant (50% or greater) stenosis. Left Carotid: Common carotid, internal carotid, and external carotid arteries are patent without significant (50% or greater) stenosis or evidence of dissection.  Mild atherosclerosis of the left carotid bulb without significant (50% or greater) stenosis. Right Vertebral: No significant stenosis, occlusion, or dissection. Left Vertebral: The left V1 and proximal V2 segments of the vertebral artery are occluded. There is irregular and intermittent reconstitution of the left mid V2 segment. The more distal V2 segment and V3 segments are patent. 1.5 cm venous varix at the junction of the right internal jugular vein and right brachiocephalic vein. CTA HEAD: Anterior circulation Bilateral internal carotid, anterior cerebral, and middle cerebral arteries are normal in caliber, without occlusion, significant stenosis, or aneurysmal dilation. Posterior circulation The vertebrobasilar system, superior cerebellar arteries, and posterior cerebral arteries are normal in caliber, without occlusion, significant stenosis, or aneurysmal dilation. Dural sinuses are grossly patent. NECK OSSEOUS AND SOFT TISSUE STRUCTURES: Visualized prominent soft tissue density just ventral to the neal measuring 1.9 cm is likely a subcarinal lymph node. Moderate emphysema, most conspicuous in the right upper lung. Calcific density just ventral to the dens inferior to the anterior arch of C1 likely represent sequelae of prior calcific tendinitis.  Paranasal sinuses are essentially clear.  Mastoid air cells are essentially clear.  C5-C7 ACDF.     Impression: CT head: 1.  No acute intracranial abnormality. No acute territorial infarct. No acute intracranial hemorrhage. MRI is more sensitive for acute ischemia. 2.  Small to moderate-sized area of encephalomalacia in the right parietal lobe, likely a chronic infarct. CTA head and neck: 1.  Occlusion of the proximal left vertebral artery  with irregular and intermittent reconstitution of the mid left V2 segment. The distal left V2 and V3 segments are patent. Findings are concerning for an age-indeterminate dissection of the proximal left  vertebral artery. 2.  Within the confines of motion artifact at the level of the carotid bifurcations: Mild atherosclerosis of both carotid bulbs without significant (50% or greater) stenosis. 3.  1.5 cm venous varix at the junction of the right internal jugular vein and right brachiocephalic vein. 4.  Prominent soft tissue density just ventral to the neal measuring 1.9 cm in transverse dimension is likely an enlarged subcarinal lymph node. Suggest a CT of the chest, not necessarily emergently. 5.  Moderate emphysema. Electronically signed by:  Tyler Magdaleno DO  4/9/2022 9:16 PM Mountain Time    CT Angiogram Head w AI Analysis of LVO    Addendum Date: 4/9/2022 Addendum:   These results were communicated to ELIZABETH PINA at 4/9/2022 9:21 PM Mountain Time. Electronically signed by:  Tyler Magdaleno DO  4/9/2022 9:21 PM    Result Date: 4/9/2022  Narrative: EXAMINATION: CT HEAD WO CONTRAST STROKE PROTOCOL, CT ANGIOGRAM HEAD W AI ANALYSIS OF LVO, CT ANGIOGRAM NECK DATE: 4/9/2022 10:07 PM  INDICATION: Altered mental status. History of intracranial hemorrhage.  COMPARISON: None available.  TECHNIQUE: Thin section noncontrast axial images were obtained through the head. CT angiography through the head and neck was performed in the axial plane using 85 mL of Isovue-370 administered intravenously, without adverse reaction. Coronal and sagittal MIP and MPR images were generated. AI analysis of LVO was utilized. Percent stenoses are reported by NASCET criteria. CT dose lowering techniques were used, to include: automated exposure control, adjustment for patient size, and or use of iterative reconstruction. FINDINGS: CT HEAD: No hemorrhage or extra-axial fluid collection. No CT evidence of acute ischemia. No intracranial mass  or mass effect. Small to moderate-sized area of encephalomalacia in the right parietal lobe, likely a chronic infarct. Age-appropriate parenchymal volume. No hydrocephalus. Basal cisterns are patent. Orbital structures are unremarkable. Calvarium is intact. CTA NECK: Motion artifact at the level of the mandible/carotid bifurcations. Within these confines: Aorta: Visualized aortic arch and great vessel origins are patent. Right Carotid: Common carotid, internal carotid, and external carotid arteries are patent without significant (50% or greater) stenosis or evidence of dissection.  Mild atherosclerosis of the right carotid bulb without significant (50% or greater) stenosis. Left Carotid: Common carotid, internal carotid, and external carotid arteries are patent without significant (50% or greater) stenosis or evidence of dissection.  Mild atherosclerosis of the left carotid bulb without significant (50% or greater) stenosis. Right Vertebral: No significant stenosis, occlusion, or dissection. Left Vertebral: The left V1 and proximal V2 segments of the vertebral artery are occluded. There is irregular and intermittent reconstitution of the left mid V2 segment. The more distal V2 segment and V3 segments are patent. 1.5 cm venous varix at the junction of the right internal jugular vein and right brachiocephalic vein. CTA HEAD: Anterior circulation Bilateral internal carotid, anterior cerebral, and middle cerebral arteries are normal in caliber, without occlusion, significant stenosis, or aneurysmal dilation. Posterior circulation The vertebrobasilar system, superior cerebellar arteries, and posterior cerebral arteries are normal in caliber, without occlusion, significant stenosis, or aneurysmal dilation. Dural sinuses are grossly patent. NECK OSSEOUS AND SOFT TISSUE STRUCTURES: Visualized prominent soft tissue density just ventral to the neal measuring 1.9 cm is likely a subcarinal lymph node. Moderate emphysema, most  conspicuous in the right upper lung. Calcific density just ventral to the dens inferior to the anterior arch of C1 likely represent sequelae of prior calcific tendinitis.  Paranasal sinuses are essentially clear.  Mastoid air cells are essentially clear.  C5-C7 ACDF.     Impression: CT head: 1.  No acute intracranial abnormality. No acute territorial infarct. No acute intracranial hemorrhage. MRI is more sensitive for acute ischemia. 2.  Small to moderate-sized area of encephalomalacia in the right parietal lobe, likely a chronic infarct. CTA head and neck: 1.  Occlusion of the proximal left vertebral artery with irregular and intermittent reconstitution of the mid left V2 segment. The distal left V2 and V3 segments are patent. Findings are concerning for an age-indeterminate dissection of the proximal left  vertebral artery. 2.  Within the confines of motion artifact at the level of the carotid bifurcations: Mild atherosclerosis of both carotid bulbs without significant (50% or greater) stenosis. 3.  1.5 cm venous varix at the junction of the right internal jugular vein and right brachiocephalic vein. 4.  Prominent soft tissue density just ventral to the neal measuring 1.9 cm in transverse dimension is likely an enlarged subcarinal lymph node. Suggest a CT of the chest, not necessarily emergently. 5.  Moderate emphysema. Electronically signed by:  Tyler Magdaleno DO  4/9/2022 9:16 PM Mountain Time      Assessment / Plan      Assessment/Plan:   1. Mediastinal lymphadenopathy (Primary)  -Unclear etiology and incidentally noted on CT scan during her recent hospitalization.  Differential including malignant versus reactive adenopathy  -No significant primary lesion noted on CT C/A/P and MRI brain  -Per patient she is up-to-date on routine age-appropriate cancer screening  -We will plan to repeat CT scan in 1 month and consider possible biopsy based on results  -     CBC & Differential; Future  -     Comprehensive  Metabolic Panel; Future  -     Lactate Dehydrogenase; Future  -     CT Abdomen Pelvis With Contrast; Future  -     CT Chest With Contrast Diagnostic; Future           Follow Up:   Follow-up in 1 month     Mario Cochran MD  Hematology and Oncology     Please note that portions of this note may have been completed with a voice recognition program. Efforts were made to edit the dictations, but occasionally words are mistranscribed.

## 2022-06-03 ENCOUNTER — HOSPITAL ENCOUNTER (OUTPATIENT)
Dept: CT IMAGING | Facility: HOSPITAL | Age: 72
Discharge: HOME | End: 2022-06-03
Admitting: INTERNAL MEDICINE

## 2022-06-03 DIAGNOSIS — R59.0 MEDIASTINAL LYMPHADENOPATHY: ICD-10-CM

## 2022-06-03 LAB — CREAT BLDA-MCNC: 1 MG/DL (ref 0.6–1.3)

## 2022-06-03 PROCEDURE — 25010000002 IOPAMIDOL 61 % SOLUTION: Performed by: INTERNAL MEDICINE

## 2022-06-03 PROCEDURE — 74177 CT ABD & PELVIS W/CONTRAST: CPT

## 2022-06-03 PROCEDURE — 82565 ASSAY OF CREATININE: CPT

## 2022-06-03 PROCEDURE — 71260 CT THORAX DX C+: CPT

## 2022-06-03 RX ADMIN — IOPAMIDOL 95 ML: 612 INJECTION, SOLUTION INTRAVENOUS at 15:42

## 2022-06-06 ENCOUNTER — OFFICE VISIT (OUTPATIENT)
Dept: ONCOLOGY | Facility: CLINIC | Age: 72
End: 2022-06-06

## 2022-06-06 VITALS
SYSTOLIC BLOOD PRESSURE: 122 MMHG | HEART RATE: 104 BPM | WEIGHT: 130 LBS | HEIGHT: 67 IN | TEMPERATURE: 96.6 F | BODY MASS INDEX: 20.4 KG/M2 | DIASTOLIC BLOOD PRESSURE: 58 MMHG | OXYGEN SATURATION: 100 % | RESPIRATION RATE: 18 BRPM

## 2022-06-06 DIAGNOSIS — R59.0 MEDIASTINAL LYMPHADENOPATHY: Primary | ICD-10-CM

## 2022-06-06 PROCEDURE — 99214 OFFICE O/P EST MOD 30 MIN: CPT | Performed by: INTERNAL MEDICINE

## 2022-06-06 RX ORDER — NITROGLYCERIN 0.4 MG/1
TABLET SUBLINGUAL
COMMUNITY
Start: 2022-05-18

## 2022-06-06 RX ORDER — ONDANSETRON 4 MG/1
4 TABLET, FILM COATED ORAL EVERY 6 HOURS PRN
COMMUNITY
Start: 2022-05-18

## 2022-06-06 RX ORDER — FLUOXETINE 10 MG/1
10 CAPSULE ORAL DAILY
COMMUNITY
Start: 2022-05-18 | End: 2022-06-29 | Stop reason: ALTCHOICE

## 2022-06-06 RX ORDER — LEVOTHYROXINE SODIUM 88 UG/1
88 TABLET ORAL DAILY
COMMUNITY
Start: 2022-05-18

## 2022-06-06 RX ORDER — CELECOXIB 100 MG/1
100 CAPSULE ORAL DAILY
COMMUNITY
Start: 2022-05-07 | End: 2022-07-22

## 2022-06-06 RX ORDER — DONEPEZIL HYDROCHLORIDE 5 MG/1
5 TABLET, FILM COATED ORAL
COMMUNITY
Start: 2022-05-18

## 2022-06-06 RX ORDER — CELECOXIB 200 MG/1
200 CAPSULE ORAL DAILY
COMMUNITY
Start: 2022-05-24

## 2022-06-06 RX ORDER — HYDROCODONE BITARTRATE AND ACETAMINOPHEN 5; 325 MG/1; MG/1
1 TABLET ORAL 2 TIMES DAILY PRN
COMMUNITY
Start: 2022-05-24

## 2022-06-06 NOTE — PROGRESS NOTES
Follow Up Office Visit      Date: 2022     Patient Name: Oneyda Luna  MRN: [id number]  : 1950  Referring Physician: Hospital Follow up      Chief Complaint:  Follow-up for mediastinal adenopathy     History of Present Illness: Oneyda Luna is a pleasant 71 y.o. female with a past medical history of hypothyroidism, tobacco abuse, anxiety, hyperlipidemia, CVA, CAD who presents today for evaluation of mediastinal adenopathy. The patient is accompanied by their  who contributes to the history of their care.  Patient was recently hospitalized for altered mental status secondary to UTI/pyelonephritis as well as concerns for a left vertebral artery dissection.  As part of her work-up, she had a CT scan of her chest which was notable for mildly enlarged calcified and noncalcified mediastinal and hilar lymph nodes measuring up to 1.8 cm in the subcarinal region.  The rest of her CT scan showed no evidence of primary lesion.  She also had an MRI of her brain which did not reveal any concerning cancerous lesions.  She is a former smoker x30 years but quit in 2017.  She denies any chest pain, shortness of breath, chest tightness.  She is overall anxious.  She states she is up-to-date on her mammograms and colonoscopies with her primary care physician.  She is compliant with other home medications    Interval History:   Presents to clinic for follow-up.  Overall doing well at this time.  Denies any fevers, chills, night sweats, weight loss.  Does note continued and stable arthritic pains    Oncology History:    Oncology/Hematology History    No history exists.       Subjective      Review of Systems:   Constitutional: Negative for fevers, chills, or weight loss  Eyes: Negative for blurred vision or discharge         Ear/Nose/Throat: Negative for difficulty swallowing, sore throat, LAD                                                       Respiratory: Negative for cough, SOA, wheezing                                                                                         Cardiovascular: Negative for chest pain or palpitations                                                                  Gastrointestinal: Negative for nausea, vomiting or diarrhea                                                                     Genitourinary: Negative for dysuria or hematuria                                                                                           Musculoskeletal: Negative for any joint pains or muscle aches                                                                        Neurologic: Negative for any weakness, headaches, dizziness                                                                         Hematologic: Negative for any easy bleeding or bruising                                                                                   Psychiatric: Negative for anxiety or depression                          Past Medical History/Past Surgical History/ Family History/ Social History: Reviewed by me and unchanged from my previous documentation done on May 2022.     Medications:     Current Outpatient Medications:   •  amLODIPine (NORVASC) 5 MG tablet, Take 1 tablet by mouth Daily., Disp: 30 tablet, Rfl: 0  •  aspirin 81 MG chewable tablet, Chew 1 tablet Daily., Disp: 30 tablet, Rfl: 0  •  atorvastatin (LIPITOR) 40 MG tablet, Take 40 mg by mouth every night at bedtime., Disp: , Rfl:   •  baclofen (LIORESAL) 10 MG tablet, Take 10 mg by mouth 2 (Two) Times a Day As Needed., Disp: , Rfl:   •  cefdinir (OMNICEF) 300 MG capsule, Take 1 capsule by mouth 2 (Two) Times a Day., Disp: 10 capsule, Rfl: 0  •  celecoxib (CeleBREX) 100 MG capsule, Take 100 mg by mouth Daily. with food, Disp: , Rfl:   •  celecoxib (CeleBREX) 200 MG capsule, Take 200 mg by mouth Daily., Disp: , Rfl:   •  Cholecalciferol (VITAMIN D3) 5000 units capsule capsule, Take 5,000 Units by mouth Daily., Disp: , Rfl:   •  clopidogrel (PLAVIX) 75 MG  tablet, Take 75 mg by mouth Daily., Disp: , Rfl:   •  donepezil (ARICEPT) 5 MG tablet, Take 5 mg by mouth every night at bedtime., Disp: , Rfl:   •  estradiol (CLIMARA) 0.0375 MG/24HR, APPLY 1 PATCH TOPICALLY ONCE A WEEK AS DIRECTED, Disp: , Rfl:   •  estrogens, conjugated, (PREMARIN) 0.3 MG tablet, Take 0.3 mg by mouth Daily. Take daily for 21 days then do not take for 7 days., Disp: , Rfl:   •  FLUoxetine (PROzac) 10 MG capsule, Take 10 mg by mouth Daily., Disp: , Rfl:   •  gabapentin (NEURONTIN) 300 MG capsule, Take 300 mg by mouth 3 (Three) Times a Day., Disp: , Rfl:   •  HM Loratadine 10 MG tablet, TAKE ONE TABLET BY MOUTH EVERY MORNING AS DIRECTED, Disp: , Rfl:   •  HYDROcodone-acetaminophen (NORCO) 5-325 MG per tablet, Take 1 tablet by mouth 2 (Two) Times a Day As Needed. for pain, Disp: , Rfl:   •  hydrOXYzine pamoate (VISTARIL) 25 MG capsule, Take 25 mg by mouth 3 (Three) Times a Day As Needed., Disp: , Rfl:   •  ibuprofen (ADVIL,MOTRIN) 800 MG tablet, Take 800 mg by mouth Every 6 (Six) Hours As Needed for Mild Pain ., Disp: , Rfl:   •  levothyroxine (SYNTHROID, LEVOTHROID) 100 MCG tablet, Take 1 tablet by mouth Every Morning., Disp: 30 tablet, Rfl: 0  •  levothyroxine (SYNTHROID, LEVOTHROID) 112 MCG tablet, Take 112 mcg by mouth Daily., Disp: , Rfl:   •  levothyroxine (SYNTHROID, LEVOTHROID) 88 MCG tablet, Take 88 mcg by mouth Daily., Disp: , Rfl:   •  metoprolol succinate XL (TOPROL-XL) 25 MG 24 hr tablet, Take 1 tablet by mouth Daily., Disp: 30 tablet, Rfl: 0  •  nitroglycerin (NITROSTAT) 0.4 MG SL tablet, DISSOLVE 1 TABLET UNDER THE TONGUE EVERY 5 MINUTES AS NEEDED FOR CHEST PAIN. DO NOT EXCEED A TOTAL OF 3 DOSES IN 15 MINUTES., Disp: , Rfl:   •  ondansetron (ZOFRAN) 4 MG tablet, Take 4 mg by mouth Every 6 (Six) Hours As Needed., Disp: , Rfl:   •  vitamin D (ERGOCALCIFEROL) 1.25 MG (35175 UT) capsule capsule, Take 50,000 Units by mouth Every 7 (Seven) Days.  , Disp: , Rfl:   •  zolpidem (AMBIEN) 10 MG  "tablet, Take 10 mg by mouth every night at bedtime., Disp: , Rfl:     Allergies:   Allergies   Allergen Reactions   • Molds & Smuts Other (See Comments)     Sneezing and congestion         Objective     Physical Exam:  Vital Signs:   Vitals:    06/06/22 1329   BP: 122/58   Pulse: 104   Resp: 18   Temp: 96.6 °F (35.9 °C)   TempSrc: Temporal   SpO2: 100%   Weight: 59 kg (130 lb)   Height: 170.2 cm (67.01\")   PainSc: 0-No pain     Pain Score    06/06/22 1329   PainSc: 0-No pain     ECOG Performance Status: 0 - Asymptomatic    Constitutional: NAD, ECOG 0  Eyes: PERRLA, scleral anicteric  ENT: No LAD, no thyromegaly  Respiratory: CTAB, no wheezing, rales, rhonchi  Cardiovascular: RRR, no murmurs, pulses 2+ bilaterally  Abdomen: soft, NT/ND, no HSM  Musculoskeletal: strength 5/5 bilaterally, no c/c/e  Neurologic: A&O x 3, CN II-XII intact grossly    Results Review:   Hospital Outpatient Visit on 06/03/2022   Component Date Value Ref Range Status   • Creatinine 06/03/2022 1.00  0.60 - 1.30 mg/dL Final    Serial Number: [id number]:  [id number]       No results found.    Assessment / Plan      Assessment/Plan:   1. Mediastinal lymphadenopathy (Primary)  -Unclear etiology and incidentally noted on CT scan during her recent hospitalization.  Differential including malignant versus reactive adenopathy  -No significant primary lesion noted on CT C/A/P and MRI brain  -LDH mildly elevated in May 2022 to around 250  -CBC only notable for mild neutrophilia  -Repeat CT C/A/P in June 2022 completed but not officially read.  Per my unofficial read, or lymphadenopathy is stable to may be mildly increased with no significant primary lesion noted  -Discussed results with patient today, will plan to call her with official radiology results.  Further work-up based on the official radiology read    Follow Up:   Follow-up pending CT scan results     Mario Cochran MD  Hematology and Oncology     Please note that portions of this note may " have been completed with a voice recognition program. Efforts were made to edit the dictations, but occasionally words are mistranscribed.

## 2022-06-09 ENCOUNTER — TELEPHONE (OUTPATIENT)
Dept: ONCOLOGY | Facility: CLINIC | Age: 72
End: 2022-06-09

## 2022-06-09 DIAGNOSIS — C85.92 LYMPHOMA OF INTRATHORACIC LYMPH NODES, UNSPECIFIED LYMPHOMA TYPE: Primary | ICD-10-CM

## 2022-06-09 NOTE — TELEPHONE ENCOUNTER
Caller: Oneyda Luna    Relationship: Self    Best call back number: [phone]    What is the best time to reach you: ANYTIME     Who are you requesting to speak with (clinical staff, provider,  specific staff member): DR MAN      What was the call regarding:     MISSED CALL YESTERDAY MORNING 9:30AM FROM DR MAN REGARDING DX AND RESULTS FROM CT SCAN DID NOT SEE UNTIL LATE YESTERDAY HE HAD CALLED    RETURNING CALL TO DR MAN     Do you require a callback: YES

## 2022-06-29 ENCOUNTER — OFFICE VISIT (OUTPATIENT)
Dept: PULMONOLOGY | Facility: CLINIC | Age: 72
End: 2022-06-29

## 2022-06-29 ENCOUNTER — HOSPITAL ENCOUNTER (OUTPATIENT)
Dept: PET IMAGING | Facility: HOSPITAL | Age: 72
Discharge: HOME | End: 2022-06-29

## 2022-06-29 ENCOUNTER — PREP FOR SURGERY (OUTPATIENT)
Dept: OTHER | Facility: HOSPITAL | Age: 72
End: 2022-06-29

## 2022-06-29 VITALS
BODY MASS INDEX: 20.88 KG/M2 | WEIGHT: 133 LBS | SYSTOLIC BLOOD PRESSURE: 110 MMHG | HEART RATE: 52 BPM | DIASTOLIC BLOOD PRESSURE: 56 MMHG | OXYGEN SATURATION: 94 % | HEIGHT: 67 IN | TEMPERATURE: 97 F

## 2022-06-29 DIAGNOSIS — Z86.79 HISTORY OF CORONARY ARTERY DISEASE: ICD-10-CM

## 2022-06-29 DIAGNOSIS — R59.0 MEDIASTINAL LYMPHADENOPATHY: Primary | ICD-10-CM

## 2022-06-29 DIAGNOSIS — R91.8 LUNG MASS: Primary | ICD-10-CM

## 2022-06-29 DIAGNOSIS — C85.92 LYMPHOMA OF INTRATHORACIC LYMPH NODES, UNSPECIFIED LYMPHOMA TYPE: ICD-10-CM

## 2022-06-29 DIAGNOSIS — Z86.73 HISTORY OF CVA IN ADULTHOOD: ICD-10-CM

## 2022-06-29 DIAGNOSIS — J44.9 COPD MIXED TYPE: ICD-10-CM

## 2022-06-29 DIAGNOSIS — Z87.891 FORMER SMOKER: ICD-10-CM

## 2022-06-29 LAB — GLUCOSE BLDC GLUCOMTR-MCNC: 79 MG/DL (ref 70–130)

## 2022-06-29 PROCEDURE — 82962 GLUCOSE BLOOD TEST: CPT

## 2022-06-29 PROCEDURE — A9552 F18 FDG: HCPCS | Performed by: INTERNAL MEDICINE

## 2022-06-29 PROCEDURE — 0 FLUDEOXYGLUCOSE F18 SOLUTION: Performed by: INTERNAL MEDICINE

## 2022-06-29 PROCEDURE — 99205 OFFICE O/P NEW HI 60 MIN: CPT | Performed by: INTERNAL MEDICINE

## 2022-06-29 PROCEDURE — 78815 PET IMAGE W/CT SKULL-THIGH: CPT

## 2022-06-29 RX ORDER — SODIUM CHLORIDE 0.9 % (FLUSH) 0.9 %
10 SYRINGE (ML) INJECTION AS NEEDED
Status: CANCELLED | OUTPATIENT
Start: 2022-06-29

## 2022-06-29 RX ORDER — SODIUM CHLORIDE 0.9 % (FLUSH) 0.9 %
3 SYRINGE (ML) INJECTION EVERY 12 HOURS SCHEDULED
Status: CANCELLED | OUTPATIENT
Start: 2022-06-29

## 2022-06-29 RX ORDER — SODIUM CHLORIDE 9 MG/ML
125 INJECTION, SOLUTION INTRAVENOUS CONTINUOUS
Status: CANCELLED | OUTPATIENT
Start: 2022-06-29

## 2022-06-29 RX ORDER — FLUOXETINE HYDROCHLORIDE 20 MG/1
20 CAPSULE ORAL DAILY
COMMUNITY
Start: 2022-06-13

## 2022-06-29 RX ORDER — LIDOCAINE HYDROCHLORIDE 40 MG/ML
4 INJECTION, SOLUTION RETROBULBAR; TOPICAL ONCE
Status: CANCELLED | OUTPATIENT
Start: 2022-06-29 | End: 2022-06-29

## 2022-06-29 RX ADMIN — FLUDEOXYGLUCOSE F18 1 DOSE: 300 INJECTION INTRAVENOUS at 08:30

## 2022-06-29 NOTE — PROGRESS NOTES
PULMONARY  NOTE    Chief Complaint     Former smoker, chronic obstructive pulmonary disease, history of CVA, history of coronary artery disease with stents, enlarging mediastinal adenopathy    History of Present Illness     71-year-old female referred for an abnormal CT scan of the chest    She is got a lot of her care elsewhere but was admitted to [facility] with altered mental status in April 2022  During that hospital stay she had a CT scan of the chest which revealed hilar and mediastinal adenopathy that was enlarged  She was seen in consultation by Dr. Cochran and followed up with him as an outpatient  A repeat CT scan of the chest done in June revealed interval enlargement of the mediastinal lymphadenopathy  A PET CT scan was obtained which revealed abnormal hypermetabolic activity primarily in the mediastinal lymph nodes    She has been referred to our office    She denies a past history of lung disease  She has a history of tobacco abuse up till 5 years ago  She does not think that she is ever had a CT scan of the chest and specifically is not aware of ever having undergone screening CT scans of the chest in the past    She has no regular cough or sputum production  She does have general fatigue and lethargy and some dyspnea with exertion.  She is on no respiratory medications    She is on Plavix and has been so since 2016  It sounds as though she had 3 cardiac stents placed in 2015 when she was in [place]  She rapidly had what sounds to have been in-stent restenosis requiring a repeat procedure and additional stent  Then about a year after that she developed CVAs  She indicates that she has been on Plavix since that time and to her knowledge its never been stopped for any type of procedure  She indicates that her PCP is managing her antiplatelet therapy    Patient Active Problem List   Diagnosis   • Altered mental status, unspecified altered mental status type   • Nausea and vomiting   • Graves disease   •  Mediastinal lymphadenopathy   • Former smoker (Stopped x 5 years)   • COPD   • History of CVA   • History of coronary artery disease (Prior stents)     Allergies   Allergen Reactions   • Molds & Smuts Other (See Comments)     Sneezing and congestion         Current Outpatient Medications:   •  amLODIPine (NORVASC) 5 MG tablet, Take 1 tablet by mouth Daily., Disp: 30 tablet, Rfl: 0  •  aspirin 81 MG chewable tablet, Chew 1 tablet Daily., Disp: 30 tablet, Rfl: 0  •  atorvastatin (LIPITOR) 40 MG tablet, Take 40 mg by mouth every night at bedtime., Disp: , Rfl:   •  baclofen (LIORESAL) 10 MG tablet, Take 10 mg by mouth 2 (Two) Times a Day As Needed., Disp: , Rfl:   •  cefdinir (OMNICEF) 300 MG capsule, Take 1 capsule by mouth 2 (Two) Times a Day., Disp: 10 capsule, Rfl: 0  •  celecoxib (CeleBREX) 200 MG capsule, Take 200 mg by mouth Daily., Disp: , Rfl:   •  Cholecalciferol (VITAMIN D3) 5000 units capsule capsule, Take 5,000 Units by mouth Daily., Disp: , Rfl:   •  clopidogrel (PLAVIX) 75 MG tablet, Take 75 mg by mouth Daily., Disp: , Rfl:   •  estradiol (CLIMARA) 0.0375 MG/24HR, APPLY 1 PATCH TOPICALLY ONCE A WEEK AS DIRECTED, Disp: , Rfl:   •  estrogens, conjugated, (PREMARIN) 0.3 MG tablet, Take 0.3 mg by mouth Daily. Take daily for 21 days then do not take for 7 days., Disp: , Rfl:   •  gabapentin (NEURONTIN) 300 MG capsule, Take 300 mg by mouth 3 (Three) Times a Day., Disp: , Rfl:   •  HM Loratadine 10 MG tablet, TAKE ONE TABLET BY MOUTH EVERY MORNING AS DIRECTED, Disp: , Rfl:   •  HYDROcodone-acetaminophen (NORCO) 5-325 MG per tablet, Take 1 tablet by mouth 2 (Two) Times a Day As Needed. for pain, Disp: , Rfl:   •  hydrOXYzine pamoate (VISTARIL) 25 MG capsule, Take 25 mg by mouth 3 (Three) Times a Day As Needed., Disp: , Rfl:   •  ibuprofen (ADVIL,MOTRIN) 800 MG tablet, Take 800 mg by mouth Every 6 (Six) Hours As Needed for Mild Pain ., Disp: , Rfl:   •  levothyroxine (SYNTHROID, LEVOTHROID) 112 MCG tablet, Take  112 mcg by mouth Daily., Disp: , Rfl:   •  metoprolol succinate XL (TOPROL-XL) 25 MG 24 hr tablet, Take 1 tablet by mouth Daily., Disp: 30 tablet, Rfl: 0  •  nitroglycerin (NITROSTAT) 0.4 MG SL tablet, DISSOLVE 1 TABLET UNDER THE TONGUE EVERY 5 MINUTES AS NEEDED FOR CHEST PAIN. DO NOT EXCEED A TOTAL OF 3 DOSES IN 15 MINUTES., Disp: , Rfl:   •  ondansetron (ZOFRAN) 4 MG tablet, Take 4 mg by mouth Every 6 (Six) Hours As Needed., Disp: , Rfl:   •  vitamin D (ERGOCALCIFEROL) 1.25 MG (24448 UT) capsule capsule, Take 50,000 Units by mouth Every 7 (Seven) Days.  , Disp: , Rfl:   •  celecoxib (CeleBREX) 100 MG capsule, Take 100 mg by mouth Daily. with food, Disp: , Rfl:   •  donepezil (ARICEPT) 5 MG tablet, Take 5 mg by mouth every night at bedtime., Disp: , Rfl:   •  FLUoxetine (PROzac) 20 MG capsule, Take 20 mg by mouth Daily., Disp: , Rfl:   •  levothyroxine (SYNTHROID, LEVOTHROID) 100 MCG tablet, Take 1 tablet by mouth Every Morning., Disp: 30 tablet, Rfl: 0  •  levothyroxine (SYNTHROID, LEVOTHROID) 88 MCG tablet, Take 88 mcg by mouth Daily., Disp: , Rfl:   •  zolpidem (AMBIEN) 10 MG tablet, Take 10 mg by mouth every night at bedtime., Disp: , Rfl:   No current facility-administered medications for this visit.  MEDICATION LIST AND ALLERGIES REVIEWED.    Family History   Problem Relation Age of Onset   • Osteoarthritis Mother    • Hypertension Mother    • Heart attack Mother    • Osteoarthritis Father    • Hypertension Father      Social History     Tobacco Use   • Smoking status: Former Smoker     Years: 30.00     Quit date: 2017     Years since quittin.4   • Smokeless tobacco: Never Used   • Tobacco comment: 2017   Substance Use Topics   • Alcohol use: No   • Drug use: No     Social History     Social History Narrative    Former smoker, stopped smoking approximately 2017    Denies alcohol use     FAMILY AND SOCIAL HISTORY REVIEWED.    Review of Systems  ALSO REFER TO SCANNED ROS SHEET FROM SAME DATE.    BP  "110/56 (BP Location: Right arm, Patient Position: Sitting, Cuff Size: Adult)   Pulse 52   Temp 97 °F (36.1 °C) (Infrared)   Ht 170.2 cm (67.01\")   Wt 60.3 kg (133 lb)   SpO2 94% Comment: resting, room air  Breastfeeding No   BMI 20.82 kg/m²   Physical Exam  Vitals and nursing note reviewed.   Constitutional:       General: She is not in acute distress.     Appearance: She is well-developed. She is not diaphoretic.   HENT:      Head: Normocephalic and atraumatic.   Neck:      Thyroid: No thyromegaly.   Cardiovascular:      Rate and Rhythm: Normal rate and regular rhythm.      Heart sounds: Normal heart sounds. No murmur heard.  Pulmonary:      Effort: Pulmonary effort is normal.      Breath sounds: Normal breath sounds. No stridor.   Chest:   Breasts:      Right: No supraclavicular adenopathy.      Left: No supraclavicular adenopathy.       Lymphadenopathy:      Cervical: No cervical adenopathy.      Upper Body:      Right upper body: No supraclavicular or epitrochlear adenopathy.      Left upper body: No supraclavicular or epitrochlear adenopathy.   Skin:     General: Skin is warm and dry.   Neurological:      Mental Status: She is alert and oriented to person, place, and time.   Psychiatric:         Behavior: Behavior normal.         Results     CT scan of the chest from 4/10/2022, 6/3/2022 and PET CT scan from 6/29/2022 all reviewed on PACS  She has mediastinal and hilar lymphadenopathy that has exhibited interval enlargement over the period of 2 months that on pet imaging exhibit abnormal hypermetabolic activity  No predominant or focal parenchymal lesion    Immunization History   Administered Date(s) Administered   • COVID-19 (MODERNA) 1st, 2nd, 3rd Dose Only 01/12/2021, 02/09/2021, 09/10/2021   • Hepatitis B Vaccine Adult IM 10/27/2004, 12/08/2004, 04/13/2005     Problem List       ICD-10-CM ICD-9-CM   1. Mediastinal lymphadenopathy  R59.0 785.6   2. Former smoker (Stopped x 5 years)  Z87.891 V15.82   3. " COPD  J44.9 496   4. History of coronary artery disease (Prior stents)  Z86.79 V12.59   5. History of CVA  Z86.73 V12.54       Discussion     We reviewed her chest imaging  She has abnormally enlarged mediastinal hilar lymphadenopathy that has exhibited interval enlargement and also has abnormal hypermetabolic activity on PET scan  We discussed the differential diagnosis which includes infectious and inflammatory as well as malignant etiologies  Lymphoma more likely than carcinoma given the lack of a predominant parenchymal lesion    We discussed diagnostic options including biopsy  We discussed biopsy options including surgical biopsy and bronchoscopy with endobronchial ultrasound  We discussed risks and benefits including bleeding, infection, and pneumothorax    Her plan at this point is to perform a bronchoscopy with endobronchial ultrasound and transbronchial needle aspiration  Will send samples for both routine histopathology as well as flow cytometry    She is on Plavix  She has been on it for a lot a years  She has cardiac stents but it sounds like those have been in since 2015  She indicates that she is on the Plavix at this point primarily because of prior strokes  I have called her primary care office twice now and have left a message to try and talk to her PCP about the advisability of stopping her Plavix prior to the procedure  If it is not safe to stop the Plavix then we will go ahead and do the bronchoscopy with needle aspirate with the understanding that the patient is at an increased risk for bleeding complications    Further work-up based on the results of the above    I talked with Dr. Mills on the phone who was familiar with Ms. Luna and felt that it would be safe to stop her Plavix in anticipation of the bronchoscopy    Level of service justified based on 64 minutes spent in patient care on this date of service including, but not limited to: preparing to see the patient, obtaining and/or  reviewing history, performing medically appropriate examination, ordering tests/medicine/procedures, independently interpreting results, documenting clinical information in EHR, and counseling/education of patient/family/caregiver (excluding time spent on other separate services such as performing procedures or test interpretation, if applicable). (Level 4 45-59 minutes; Level 5 60-74 minutes)    Phillip Rowan MD  Note electronically signed    CC: David Brown PA

## 2022-06-30 ENCOUNTER — HOSPITAL ENCOUNTER (OUTPATIENT)
Facility: HOSPITAL | Age: 72
Setting detail: HOSPITAL OUTPATIENT SURGERY
End: 2022-06-30
Attending: INTERNAL MEDICINE | Admitting: INTERNAL MEDICINE

## 2022-06-30 PROBLEM — R91.8 LUNG MASS: Status: ACTIVE | Noted: 2022-06-30

## 2022-07-01 ENCOUNTER — PRE-ADMISSION TESTING (OUTPATIENT)
Dept: PREADMISSION TESTING | Facility: HOSPITAL | Age: 72
End: 2022-07-01

## 2022-07-01 VITALS — WEIGHT: 132.28 LBS | HEIGHT: 67 IN | BODY MASS INDEX: 20.76 KG/M2

## 2022-07-01 DIAGNOSIS — R91.8 LUNG MASS: ICD-10-CM

## 2022-07-01 LAB
ALBUMIN SERPL-MCNC: 4.3 G/DL (ref 3.5–5.2)
ALBUMIN/GLOB SERPL: 1.3 G/DL
ALP SERPL-CCNC: 116 U/L (ref 39–117)
ALT SERPL W P-5'-P-CCNC: 15 U/L (ref 1–33)
ANION GAP SERPL CALCULATED.3IONS-SCNC: 9 MMOL/L (ref 5–15)
APTT PPP: 47.2 SECONDS (ref 22–39)
AST SERPL-CCNC: 21 U/L (ref 1–32)
BASOPHILS # BLD AUTO: 0.07 10*3/MM3 (ref 0–0.2)
BASOPHILS NFR BLD AUTO: 0.9 % (ref 0–1.5)
BILIRUB SERPL-MCNC: 0.4 MG/DL (ref 0–1.2)
BUN SERPL-MCNC: 23 MG/DL (ref 8–23)
BUN/CREAT SERPL: 21.1 (ref 7–25)
CALCIUM SPEC-SCNC: 8.8 MG/DL (ref 8.6–10.5)
CHLORIDE SERPL-SCNC: 105 MMOL/L (ref 98–107)
CO2 SERPL-SCNC: 25 MMOL/L (ref 22–29)
CREAT SERPL-MCNC: 1.09 MG/DL (ref 0.57–1)
DEPRECATED RDW RBC AUTO: 46.9 FL (ref 37–54)
EGFRCR SERPLBLD CKD-EPI 2021: 54.4 ML/MIN/1.73
EOSINOPHIL # BLD AUTO: 0.96 10*3/MM3 (ref 0–0.4)
EOSINOPHIL NFR BLD AUTO: 11.9 % (ref 0.3–6.2)
ERYTHROCYTE [DISTWIDTH] IN BLOOD BY AUTOMATED COUNT: 13.8 % (ref 12.3–15.4)
GLOBULIN UR ELPH-MCNC: 3.4 GM/DL
GLUCOSE SERPL-MCNC: 89 MG/DL (ref 65–99)
HCT VFR BLD AUTO: 32.6 % (ref 34–46.6)
HGB BLD-MCNC: 10.4 G/DL (ref 12–15.9)
IMM GRANULOCYTES # BLD AUTO: 0.1 10*3/MM3 (ref 0–0.05)
IMM GRANULOCYTES NFR BLD AUTO: 1.2 % (ref 0–0.5)
INR PPP: 1.05 (ref 0.84–1.13)
LYMPHOCYTES # BLD AUTO: 1.42 10*3/MM3 (ref 0.7–3.1)
LYMPHOCYTES NFR BLD AUTO: 17.5 % (ref 19.6–45.3)
MCH RBC QN AUTO: 29.6 PG (ref 26.6–33)
MCHC RBC AUTO-ENTMCNC: 31.9 G/DL (ref 31.5–35.7)
MCV RBC AUTO: 92.9 FL (ref 79–97)
MONOCYTES # BLD AUTO: 1.08 10*3/MM3 (ref 0.1–0.9)
MONOCYTES NFR BLD AUTO: 13.3 % (ref 5–12)
NEUTROPHILS NFR BLD AUTO: 4.47 10*3/MM3 (ref 1.7–7)
NEUTROPHILS NFR BLD AUTO: 55.2 % (ref 42.7–76)
NRBC BLD AUTO-RTO: 0 /100 WBC (ref 0–0.2)
PLATELET # BLD AUTO: 244 10*3/MM3 (ref 140–450)
PMV BLD AUTO: 10.1 FL (ref 6–12)
POTASSIUM SERPL-SCNC: 3.9 MMOL/L (ref 3.5–5.2)
PROT SERPL-MCNC: 7.7 G/DL (ref 6–8.5)
PROTHROMBIN TIME: 13.6 SECONDS (ref 11.4–14.4)
QT INTERVAL: 452 MS
QTC INTERVAL: 515 MS
RBC # BLD AUTO: 3.51 10*6/MM3 (ref 3.77–5.28)
SODIUM SERPL-SCNC: 139 MMOL/L (ref 136–145)
WBC NRBC COR # BLD: 8.1 10*3/MM3 (ref 3.4–10.8)

## 2022-07-01 PROCEDURE — 93005 ELECTROCARDIOGRAM TRACING: CPT

## 2022-07-01 PROCEDURE — 85730 THROMBOPLASTIN TIME PARTIAL: CPT | Performed by: INTERNAL MEDICINE

## 2022-07-01 PROCEDURE — 80053 COMPREHEN METABOLIC PANEL: CPT | Performed by: INTERNAL MEDICINE

## 2022-07-01 PROCEDURE — 36415 COLL VENOUS BLD VENIPUNCTURE: CPT

## 2022-07-01 PROCEDURE — 93010 ELECTROCARDIOGRAM REPORT: CPT | Performed by: INTERNAL MEDICINE

## 2022-07-01 PROCEDURE — 85025 COMPLETE CBC W/AUTO DIFF WBC: CPT | Performed by: INTERNAL MEDICINE

## 2022-07-01 PROCEDURE — 85610 PROTHROMBIN TIME: CPT | Performed by: INTERNAL MEDICINE

## 2022-07-01 NOTE — PAT
Patient came to pat for preop. After asking patient about blood thinner use the pt states she stopped it for surgery but didn't get it cleared by her cardiologist (filiberto) pt does have stents also as well as a pm and abnormal ekg. Called filiberto office and they will be closed Monday and filiberto isnt in office today so pt will not be able to be cleared for surgery. Informed dr archibald office and they state will need to reschedule surgery after talking with dr archibald directly -  Their office informed pt that she would be rescheduled until they could get clearance from filiberto office. Pt verbalize understanding.

## 2022-07-04 ENCOUNTER — ANESTHESIA EVENT (OUTPATIENT)
Dept: GASTROENTEROLOGY | Facility: HOSPITAL | Age: 72
End: 2022-07-04

## 2022-07-04 RX ORDER — HYDROMORPHONE HYDROCHLORIDE 1 MG/ML
0.5 INJECTION, SOLUTION INTRAMUSCULAR; INTRAVENOUS; SUBCUTANEOUS
Status: CANCELLED | OUTPATIENT
Start: 2022-07-04

## 2022-07-04 RX ORDER — FAMOTIDINE 10 MG/ML
20 INJECTION, SOLUTION INTRAVENOUS ONCE
Status: CANCELLED | OUTPATIENT
Start: 2022-07-04 | End: 2022-07-04

## 2022-07-04 RX ORDER — FENTANYL CITRATE 50 UG/ML
50 INJECTION, SOLUTION INTRAMUSCULAR; INTRAVENOUS
Status: CANCELLED | OUTPATIENT
Start: 2022-07-04

## 2022-07-04 RX ORDER — SODIUM CHLORIDE 0.9 % (FLUSH) 0.9 %
10 SYRINGE (ML) INJECTION EVERY 12 HOURS SCHEDULED
Status: CANCELLED | OUTPATIENT
Start: 2022-07-04

## 2022-07-04 RX ORDER — FAMOTIDINE 20 MG/1
20 TABLET, FILM COATED ORAL ONCE
Status: CANCELLED | OUTPATIENT
Start: 2022-07-04 | End: 2022-07-04

## 2022-07-04 RX ORDER — MIDAZOLAM HYDROCHLORIDE 1 MG/ML
0.5 INJECTION INTRAMUSCULAR; INTRAVENOUS
Status: CANCELLED | OUTPATIENT
Start: 2022-07-04

## 2022-07-04 RX ORDER — SODIUM CHLORIDE 0.9 % (FLUSH) 0.9 %
10 SYRINGE (ML) INJECTION AS NEEDED
Status: CANCELLED | OUTPATIENT
Start: 2022-07-04

## 2022-07-04 RX ORDER — SODIUM CHLORIDE, SODIUM LACTATE, POTASSIUM CHLORIDE, CALCIUM CHLORIDE 600; 310; 30; 20 MG/100ML; MG/100ML; MG/100ML; MG/100ML
9 INJECTION, SOLUTION INTRAVENOUS CONTINUOUS
Status: CANCELLED | OUTPATIENT
Start: 2022-07-04

## 2022-07-04 RX ORDER — LIDOCAINE HYDROCHLORIDE 10 MG/ML
0.5 INJECTION, SOLUTION EPIDURAL; INFILTRATION; INTRACAUDAL; PERINEURAL ONCE AS NEEDED
Status: CANCELLED | OUTPATIENT
Start: 2022-07-04

## 2022-07-05 ENCOUNTER — ANESTHESIA (OUTPATIENT)
Dept: GASTROENTEROLOGY | Facility: HOSPITAL | Age: 72
End: 2022-07-05

## 2022-07-05 ENCOUNTER — PREP FOR SURGERY (OUTPATIENT)
Dept: OTHER | Facility: HOSPITAL | Age: 72
End: 2022-07-05

## 2022-07-05 DIAGNOSIS — R91.8 LUNG MASS: Primary | ICD-10-CM

## 2022-07-05 PROCEDURE — C1726 CATH, BAL DIL, NON-VASCULAR: HCPCS | Performed by: INTERNAL MEDICINE

## 2022-07-05 RX ORDER — LIDOCAINE HYDROCHLORIDE 40 MG/ML
4 INJECTION, SOLUTION RETROBULBAR; TOPICAL ONCE
Status: CANCELLED | OUTPATIENT
Start: 2022-07-05 | End: 2022-07-05

## 2022-07-05 RX ORDER — SODIUM CHLORIDE 0.9 % (FLUSH) 0.9 %
10 SYRINGE (ML) INJECTION AS NEEDED
Status: CANCELLED | OUTPATIENT
Start: 2022-07-05

## 2022-07-05 RX ORDER — SODIUM CHLORIDE 9 MG/ML
125 INJECTION, SOLUTION INTRAVENOUS CONTINUOUS
Status: CANCELLED | OUTPATIENT
Start: 2022-07-05

## 2022-07-05 RX ORDER — SODIUM CHLORIDE 0.9 % (FLUSH) 0.9 %
3 SYRINGE (ML) INJECTION EVERY 12 HOURS SCHEDULED
Status: CANCELLED | OUTPATIENT
Start: 2022-07-05

## 2022-07-06 NOTE — PAT
Patient cleared by Dr de los santos for upcoming procedure.  Clearance letter faxed to pre Admission testing.  Fax sent to HIM for scanning.

## 2022-07-13 ENCOUNTER — ANESTHESIA EVENT (OUTPATIENT)
Dept: GASTROENTEROLOGY | Facility: HOSPITAL | Age: 72
End: 2022-07-13

## 2022-07-13 RX ORDER — LIDOCAINE HYDROCHLORIDE 10 MG/ML
0.5 INJECTION, SOLUTION EPIDURAL; INFILTRATION; INTRACAUDAL; PERINEURAL ONCE AS NEEDED
Status: CANCELLED | OUTPATIENT
Start: 2022-07-13

## 2022-07-13 RX ORDER — SODIUM CHLORIDE, SODIUM LACTATE, POTASSIUM CHLORIDE, CALCIUM CHLORIDE 600; 310; 30; 20 MG/100ML; MG/100ML; MG/100ML; MG/100ML
9 INJECTION, SOLUTION INTRAVENOUS CONTINUOUS
Status: CANCELLED | OUTPATIENT
Start: 2022-07-13

## 2022-07-13 RX ORDER — FAMOTIDINE 20 MG/1
20 TABLET, FILM COATED ORAL ONCE
Status: CANCELLED | OUTPATIENT
Start: 2022-07-13 | End: 2022-07-13

## 2022-07-14 ENCOUNTER — HOSPITAL ENCOUNTER (OUTPATIENT)
Facility: HOSPITAL | Age: 72
Setting detail: HOSPITAL OUTPATIENT SURGERY
Discharge: HOME | End: 2022-07-14
Attending: INTERNAL MEDICINE | Admitting: INTERNAL MEDICINE

## 2022-07-14 ENCOUNTER — ANESTHESIA (OUTPATIENT)
Dept: GASTROENTEROLOGY | Facility: HOSPITAL | Age: 72
End: 2022-07-14

## 2022-07-14 VITALS
OXYGEN SATURATION: 93 % | SYSTOLIC BLOOD PRESSURE: 122 MMHG | HEART RATE: 70 BPM | TEMPERATURE: 97.3 F | DIASTOLIC BLOOD PRESSURE: 61 MMHG | RESPIRATION RATE: 16 BRPM

## 2022-07-14 DIAGNOSIS — R91.8 LUNG MASS: ICD-10-CM

## 2022-07-14 PROCEDURE — 31624 DX BRONCHOSCOPE/LAVAGE: CPT | Performed by: INTERNAL MEDICINE

## 2022-07-14 PROCEDURE — C1726 CATH, BAL DIL, NON-VASCULAR: HCPCS | Performed by: INTERNAL MEDICINE

## 2022-07-14 PROCEDURE — 88305 TISSUE EXAM BY PATHOLOGIST: CPT | Performed by: INTERNAL MEDICINE

## 2022-07-14 PROCEDURE — 25010000002 ONDANSETRON PER 1 MG: Performed by: NURSE ANESTHETIST, CERTIFIED REGISTERED

## 2022-07-14 PROCEDURE — 31623 DX BRONCHOSCOPE/BRUSH: CPT | Performed by: INTERNAL MEDICINE

## 2022-07-14 PROCEDURE — 87116 MYCOBACTERIA CULTURE: CPT | Performed by: INTERNAL MEDICINE

## 2022-07-14 PROCEDURE — 31653 BRONCH EBUS SAMPLNG 3/> NODE: CPT | Performed by: INTERNAL MEDICINE

## 2022-07-14 PROCEDURE — 87102 FUNGUS ISOLATION CULTURE: CPT | Performed by: INTERNAL MEDICINE

## 2022-07-14 PROCEDURE — 87070 CULTURE OTHR SPECIMN AEROBIC: CPT | Performed by: INTERNAL MEDICINE

## 2022-07-14 PROCEDURE — 87206 SMEAR FLUORESCENT/ACID STAI: CPT | Performed by: INTERNAL MEDICINE

## 2022-07-14 PROCEDURE — 87205 SMEAR GRAM STAIN: CPT | Performed by: INTERNAL MEDICINE

## 2022-07-14 PROCEDURE — 25010000002 PROPOFOL 10 MG/ML EMULSION: Performed by: NURSE ANESTHETIST, CERTIFIED REGISTERED

## 2022-07-14 PROCEDURE — 25010000002 DEXAMETHASONE PER 1 MG: Performed by: NURSE ANESTHETIST, CERTIFIED REGISTERED

## 2022-07-14 RX ORDER — DEXAMETHASONE SODIUM PHOSPHATE 4 MG/ML
INJECTION, SOLUTION INTRA-ARTICULAR; INTRALESIONAL; INTRAMUSCULAR; INTRAVENOUS; SOFT TISSUE AS NEEDED
Status: DISCONTINUED | OUTPATIENT
Start: 2022-07-14 | End: 2022-07-14 | Stop reason: SURG

## 2022-07-14 RX ORDER — PROPOFOL 10 MG/ML
VIAL (ML) INTRAVENOUS AS NEEDED
Status: DISCONTINUED | OUTPATIENT
Start: 2022-07-14 | End: 2022-07-14 | Stop reason: SURG

## 2022-07-14 RX ORDER — ONDANSETRON 2 MG/ML
4 INJECTION INTRAMUSCULAR; INTRAVENOUS ONCE AS NEEDED
Status: DISCONTINUED | OUTPATIENT
Start: 2022-07-14 | End: 2022-07-14 | Stop reason: HOSPADM

## 2022-07-14 RX ORDER — SODIUM CHLORIDE 0.9 % (FLUSH) 0.9 %
10 SYRINGE (ML) INJECTION AS NEEDED
Status: DISCONTINUED | OUTPATIENT
Start: 2022-07-14 | End: 2022-07-14 | Stop reason: HOSPADM

## 2022-07-14 RX ORDER — IPRATROPIUM BROMIDE AND ALBUTEROL SULFATE 2.5; .5 MG/3ML; MG/3ML
3 SOLUTION RESPIRATORY (INHALATION) ONCE AS NEEDED
Status: DISCONTINUED | OUTPATIENT
Start: 2022-07-14 | End: 2022-07-14 | Stop reason: HOSPADM

## 2022-07-14 RX ORDER — ROCURONIUM BROMIDE 10 MG/ML
INJECTION, SOLUTION INTRAVENOUS AS NEEDED
Status: DISCONTINUED | OUTPATIENT
Start: 2022-07-14 | End: 2022-07-14 | Stop reason: SURG

## 2022-07-14 RX ORDER — SODIUM CHLORIDE 9 MG/ML
125 INJECTION, SOLUTION INTRAVENOUS CONTINUOUS
Status: DISCONTINUED | OUTPATIENT
Start: 2022-07-14 | End: 2022-07-14 | Stop reason: HOSPADM

## 2022-07-14 RX ORDER — ONDANSETRON 2 MG/ML
INJECTION INTRAMUSCULAR; INTRAVENOUS AS NEEDED
Status: DISCONTINUED | OUTPATIENT
Start: 2022-07-14 | End: 2022-07-14 | Stop reason: SURG

## 2022-07-14 RX ORDER — EPHEDRINE SULFATE 50 MG/ML
INJECTION, SOLUTION INTRAVENOUS AS NEEDED
Status: DISCONTINUED | OUTPATIENT
Start: 2022-07-14 | End: 2022-07-14 | Stop reason: SURG

## 2022-07-14 RX ORDER — FAMOTIDINE 10 MG/ML
20 INJECTION, SOLUTION INTRAVENOUS ONCE
Status: COMPLETED | OUTPATIENT
Start: 2022-07-14 | End: 2022-07-14

## 2022-07-14 RX ORDER — SODIUM CHLORIDE 9 MG/ML
INJECTION, SOLUTION INTRAVENOUS CONTINUOUS PRN
Status: DISCONTINUED | OUTPATIENT
Start: 2022-07-14 | End: 2022-07-14 | Stop reason: SURG

## 2022-07-14 RX ORDER — MIDAZOLAM HYDROCHLORIDE 1 MG/ML
0.5 INJECTION INTRAMUSCULAR; INTRAVENOUS
Status: DISCONTINUED | OUTPATIENT
Start: 2022-07-14 | End: 2022-07-14 | Stop reason: HOSPADM

## 2022-07-14 RX ORDER — SODIUM CHLORIDE 0.9 % (FLUSH) 0.9 %
10 SYRINGE (ML) INJECTION EVERY 12 HOURS SCHEDULED
Status: DISCONTINUED | OUTPATIENT
Start: 2022-07-14 | End: 2022-07-14 | Stop reason: HOSPADM

## 2022-07-14 RX ORDER — LIDOCAINE HYDROCHLORIDE 10 MG/ML
INJECTION, SOLUTION EPIDURAL; INFILTRATION; INTRACAUDAL; PERINEURAL AS NEEDED
Status: DISCONTINUED | OUTPATIENT
Start: 2022-07-14 | End: 2022-07-14 | Stop reason: SURG

## 2022-07-14 RX ORDER — ESMOLOL HYDROCHLORIDE 10 MG/ML
INJECTION INTRAVENOUS AS NEEDED
Status: DISCONTINUED | OUTPATIENT
Start: 2022-07-14 | End: 2022-07-14 | Stop reason: SURG

## 2022-07-14 RX ORDER — LIDOCAINE HYDROCHLORIDE 40 MG/ML
4 INJECTION, SOLUTION RETROBULBAR; TOPICAL ONCE
Status: DISCONTINUED | OUTPATIENT
Start: 2022-07-14 | End: 2022-07-14 | Stop reason: HOSPADM

## 2022-07-14 RX ADMIN — SODIUM CHLORIDE: 9 INJECTION, SOLUTION INTRAVENOUS at 07:32

## 2022-07-14 RX ADMIN — FAMOTIDINE 20 MG: 10 INJECTION, SOLUTION INTRAVENOUS at 07:23

## 2022-07-14 RX ADMIN — ESMOLOL HYDROCHLORIDE 5 MG: 10 INJECTION, SOLUTION INTRAVENOUS at 07:43

## 2022-07-14 RX ADMIN — Medication 10 ML: at 07:22

## 2022-07-14 RX ADMIN — LIDOCAINE HYDROCHLORIDE 60 MG: 10 INJECTION, SOLUTION EPIDURAL; INFILTRATION; INTRACAUDAL; PERINEURAL at 07:38

## 2022-07-14 RX ADMIN — SUGAMMADEX 200 MG: 100 INJECTION, SOLUTION INTRAVENOUS at 08:19

## 2022-07-14 RX ADMIN — ONDANSETRON 4 MG: 2 INJECTION INTRAMUSCULAR; INTRAVENOUS at 08:17

## 2022-07-14 RX ADMIN — SODIUM CHLORIDE 125 ML/HR: 9 INJECTION, SOLUTION INTRAVENOUS at 07:21

## 2022-07-14 RX ADMIN — DEXAMETHASONE SODIUM PHOSPHATE 4 MG: 4 INJECTION, SOLUTION INTRA-ARTICULAR; INTRALESIONAL; INTRAMUSCULAR; INTRAVENOUS; SOFT TISSUE at 07:43

## 2022-07-14 RX ADMIN — ROCURONIUM BROMIDE 30 MG: 10 INJECTION, SOLUTION INTRAVENOUS at 07:38

## 2022-07-14 RX ADMIN — PROPOFOL 120 MG: 10 INJECTION, EMULSION INTRAVENOUS at 07:38

## 2022-07-14 RX ADMIN — EPHEDRINE SULFATE 10 MG: 50 INJECTION, SOLUTION INTRAVENOUS at 08:04

## 2022-07-14 NOTE — ANESTHESIA PREPROCEDURE EVALUATION
Anesthesia Evaluation     Patient summary reviewed and Nursing notes reviewed   history of anesthetic complications:  NPO Solid Status: > 8 hours  NPO Liquid Status: > 8 hours           Airway   Mallampati: I  TM distance: >3 FB  Neck ROM: full  No difficulty expected  Dental      Pulmonary    (+) a smoker (5 yrs ago), COPD moderate,   (-) shortness of breath, recent URI, sleep apnea, no home oxygen    ROS comment: Med LN s  LUNG MASS  Cardiovascular     ECG reviewed    (+) pacemaker (PM -8.5 yrs life april 2022) pacemaker, CAD, cardiac stents more than 12 months ago   (-) hypertension, past MI, dysrhythmias, angina    ROS comment: ECG   Ventricular-paced rhythm with premature ventricular contractions    ECHO EF 58% mild TVR     Cleared by Dr Noriega office ([facility]) -no note found     Neuro/Psych  (+) CVA (last was 2022 min residual ), headaches,    (-) seizures    ROS Comment: episode MS change this year outside hospital -w/u showed enlarghed LN  GI/Hepatic/Renal/Endo    (+)   thyroid problem hypothyroidism  (-) no renal disease, diabetes    Musculoskeletal     (+) neck pain,   Abdominal    Substance History      OB/GYN          Other   arthritis, blood dyscrasia (HCT 32) anemia,     (-) history of cancer  ROS/Med Hx Other: HCT 32                Anesthesia Plan    ASA 3     general     (Aim MAP >65 (CAD risk))  intravenous induction     Anesthetic plan, risks, benefits, and alternatives have been provided, discussed and informed consent has been obtained with: patient.    Plan discussed with CRNA.        CODE STATUS:

## 2022-07-14 NOTE — NURSING NOTE
Pt states she received a call from Dr Noble's office and spoke with Tamela from there who stated the pt had cardiac clearance from Dr Noble to have the Bronch EBUS procedure.

## 2022-07-14 NOTE — ANESTHESIA POSTPROCEDURE EVALUATION
Patient: Oneyda Luna    Procedure Summary     Date: 07/14/22 Room / Location:  [facility]    Anesthesia Start: 0732 Anesthesia Stop:     Procedure: BRONCHOSCOPY WITH ENDOBRONCHIAL ULTRASOUND (N/A Bronchus) Diagnosis:       Lung mass      (Lung mass [R91.8])    Surgeons: Phillip Rowan MD Provider: Timur Montano MD    Anesthesia Type: general ASA Status: 3          Anesthesia Type: general    Vitals  BP: 135/72  HR: 68  RR: 18  O2: 99%  Temp: 97.4 F      Post Anesthesia Care and Evaluation    Patient location during evaluation: PACU  Patient participation: complete - patient participated  Level of consciousness: awake and alert  Pain score: 0  Pain management: adequate    Airway patency: patent  Anesthetic complications: No anesthetic complications  PONV Status: none  Cardiovascular status: hemodynamically stable and acceptable  Respiratory status: nonlabored ventilation, acceptable and nasal cannula  Hydration status: acceptable

## 2022-07-14 NOTE — OP NOTE
Bronchoscopy Procedure Note    Pre-op Diagnosis: Pathologically enlarged mediastinal lymphadenopathy    Post-op Diagnosis: Same     Surgeon: Phillip Rowan MD    Anesthesia: General    Operation: Flexible fiberoptic bronchoscopy    Findings: No endobronchial lesions    Specimen(s): Transbronchial needle aspiration from station 4R, 7, 10 are sent for routine histopathology.  Transbronchial needle aspiration station 7 sent for flow cytometry.  BAL and cytology brush right upper lobe    Estimated Blood Loss: Minimal/None    Complications: No immediate    Indications and History:  Oneyda Luna is a 71 y.o. female  with pathologically enlarged mediastinal lymphadenopathy of uncertain etiology.  The risks, benefits, complications, treatment options and expected outcomes were discussed with the patient.  The possibilities of reaction to medication, pulmonary aspiration, perforation of a viscus, bleeding, failure to diagnose a condition and creating a complication requiring transfusion or operation were discussed with the patient who freely signed the consent.      Description of Procedure:  The patient was seen in the Holding Room and an immediate patient assessment was done prior to the administration of moderate and/or deep conscious sedation.  The patient was taken to the Endoscopy Suite, identified as Oneyda Luna  and the procedure verified as Flexible Fiberoptic Bronchoscopy.  A Time Out was held and the above information confirmed.    After the induction of general anesthesia the patient was intubated with an 8.5 endotracheal tube    The bronchoscope was introduced via the endotracheal tube which confirmed good position in the distal one third of the trachea    The scope was advanced into the left mainstem bronchus.  The left upper lobe, lingula, left lower lobe, and superior segment of the left lower lobe revealed no discrete endobronchial lesions and minimal clear secretions    The scope was  advanced into the right mainstem bronchus.  The right upper lobe, bronchus intermedius, right middle lobe, right lower lobe, and superior segment of the right lower lobe revealed no discrete endobronchial lesions and minimal clear secretions.    The scope was wedged into the apical subsegment of the right upper lobe where a BAL was obtained.  60 mL instilled and 20 mL return.  A 7 mm cytology brushing specimen was obtained as well.    The scope was removed and the endobronchial ultrasound scope was introduced.  Is advanced to station 4, 7, and station 10 and specimens were obtained using a 21-gauge needle from station 4R, station 7, and station 10 R.  These were submitted for routine histopathology as well as flow cytometry.    The EBUS scope was removed and the regular scope was reintroduced.  The airways were suctioned clear and there was no evidence of active bleeding and no significant residual blood at the end of the procedure.  The scope was withdrawn without difficulty.  The patient tolerated the procedure well    The Patient was taken to the Endoscopy Recovery area in satisfactory condition.    Attestation: I performed the procedure    Phillip Rowan MD, [facility]

## 2022-07-14 NOTE — ANESTHESIA PROCEDURE NOTES
Airway  Urgency: elective    Date/Time: 7/14/2022 7:40 AM    General Information and Staff    Patient location during procedure: OR (Endo)  CRNA/CAA: Rosita Dale CRNA    Indications and Patient Condition  Indications for airway management: airway protection    Preoxygenated: yes  MILS maintained throughout  Mask difficulty assessment: 1 - vent by mask    Final Airway Details  Final airway type: endotracheal airway      Successful airway: ETT  Cuffed: yes   Successful intubation technique: direct laryngoscopy  Facilitating devices/methods: intubating stylet  Endotracheal tube insertion site: oral  Blade: Gillian  Blade size: 3  ETT size (mm): 8.5  Cormack-Lehane Classification: grade I - full view of glottis  Placement verified by: chest auscultation and capnometry   Measured from: lips  ETT/EBT  to lips (cm): 22  Number of attempts at approach: 1  Assessment: lips, teeth, and gum same as pre-op and atraumatic intubation    Additional Comments  Negative epigastric sounds, symmetrical chest rise and fall, bilateral lung sounds clear, atraumatic, dentition/lips unchanged

## 2022-07-15 ENCOUNTER — DOCUMENTATION (OUTPATIENT)
Dept: PULMONOLOGY | Facility: CLINIC | Age: 72
End: 2022-07-15

## 2022-07-15 LAB
CYTO UR: NORMAL
LAB AP CASE REPORT: NORMAL
LAB AP CLINICAL INFORMATION: NORMAL
LAB AP FLOW CYTOMETRY SUMMARY: NORMAL
PATH REPORT.FINAL DX SPEC: NORMAL
PATH REPORT.GROSS SPEC: NORMAL
REF LAB TEST METHOD: NORMAL

## 2022-07-15 NOTE — PROGRESS NOTES
The patient underwent an EBUS and transbronchial needle aspiration of the lymph nodes revealed just benign lymph nodes  So far cultures are negative    We will follow-up with her in 4 to 6 weeks  I discussed these results with the patient over the phone

## 2022-07-16 LAB
BACTERIA SPEC RESP CULT: NO GROWTH
GIE STN SPEC: NORMAL
GRAM STN SPEC: NORMAL

## 2022-07-22 ENCOUNTER — LAB (OUTPATIENT)
Dept: LAB | Facility: HOSPITAL | Age: 72
End: 2022-07-22

## 2022-07-22 ENCOUNTER — OFFICE VISIT (OUTPATIENT)
Dept: ONCOLOGY | Facility: CLINIC | Age: 72
End: 2022-07-22

## 2022-07-22 VITALS
RESPIRATION RATE: 18 BRPM | HEART RATE: 72 BPM | TEMPERATURE: 96.9 F | OXYGEN SATURATION: 94 % | BODY MASS INDEX: 20.56 KG/M2 | HEIGHT: 67 IN | DIASTOLIC BLOOD PRESSURE: 54 MMHG | SYSTOLIC BLOOD PRESSURE: 124 MMHG | WEIGHT: 131 LBS

## 2022-07-22 DIAGNOSIS — R59.0 MEDIASTINAL LYMPHADENOPATHY: Primary | ICD-10-CM

## 2022-07-22 DIAGNOSIS — R79.89 OTHER SPECIFIED ABNORMAL FINDINGS OF BLOOD CHEMISTRY: ICD-10-CM

## 2022-07-22 DIAGNOSIS — R59.0 MEDIASTINAL LYMPHADENOPATHY: ICD-10-CM

## 2022-07-22 LAB
ALBUMIN SERPL-MCNC: 4.4 G/DL (ref 3.5–5.2)
ALBUMIN/GLOB SERPL: 1.6 G/DL
ALP SERPL-CCNC: 110 U/L (ref 39–117)
ALT SERPL W P-5'-P-CCNC: 10 U/L (ref 1–33)
ANION GAP SERPL CALCULATED.3IONS-SCNC: 7 MMOL/L (ref 5–15)
AST SERPL-CCNC: 18 U/L (ref 1–32)
BILIRUB SERPL-MCNC: 0.2 MG/DL (ref 0–1.2)
BUN SERPL-MCNC: 39 MG/DL (ref 8–23)
BUN/CREAT SERPL: 23.4 (ref 7–25)
CALCIUM SPEC-SCNC: 9 MG/DL (ref 8.6–10.5)
CHLORIDE SERPL-SCNC: 106 MMOL/L (ref 98–107)
CO2 SERPL-SCNC: 26 MMOL/L (ref 22–29)
CREAT SERPL-MCNC: 1.67 MG/DL (ref 0.57–1)
EGFRCR SERPLBLD CKD-EPI 2021: 32.6 ML/MIN/1.73
ERYTHROCYTE [DISTWIDTH] IN BLOOD BY AUTOMATED COUNT: 15.1 % (ref 12.3–15.4)
FERRITIN SERPL-MCNC: 41 NG/ML (ref 13–150)
FOLATE SERPL-MCNC: 8.66 NG/ML (ref 4.78–24.2)
GLOBULIN UR ELPH-MCNC: 2.8 GM/DL
GLUCOSE SERPL-MCNC: 76 MG/DL (ref 65–99)
HCT VFR BLD AUTO: 33.8 % (ref 34–46.6)
HGB BLD-MCNC: 10.6 G/DL (ref 12–15.9)
IRON 24H UR-MRATE: 70 MCG/DL (ref 37–145)
IRON SATN MFR SERPL: 17 % (ref 20–50)
LDH SERPL-CCNC: 186 U/L (ref 135–214)
LYMPHOCYTES # BLD AUTO: 2 10*3/MM3 (ref 0.7–3.1)
LYMPHOCYTES NFR BLD AUTO: 28.8 % (ref 19.6–45.3)
MCH RBC QN AUTO: 28.4 PG (ref 26.6–33)
MCHC RBC AUTO-ENTMCNC: 31.4 G/DL (ref 31.5–35.7)
MCV RBC AUTO: 90.4 FL (ref 79–97)
MONOCYTES # BLD AUTO: 0.5 10*3/MM3 (ref 0.1–0.9)
MONOCYTES NFR BLD AUTO: 7.6 % (ref 5–12)
NEUTROPHILS NFR BLD AUTO: 4.5 10*3/MM3 (ref 1.7–7)
NEUTROPHILS NFR BLD AUTO: 63.6 % (ref 42.7–76)
PLATELET # BLD AUTO: 255 10*3/MM3 (ref 140–450)
PMV BLD AUTO: 7.3 FL (ref 6–12)
POTASSIUM SERPL-SCNC: 5.4 MMOL/L (ref 3.5–5.2)
PROT SERPL-MCNC: 7.2 G/DL (ref 6–8.5)
RBC # BLD AUTO: 3.74 10*6/MM3 (ref 3.77–5.28)
SODIUM SERPL-SCNC: 139 MMOL/L (ref 136–145)
TIBC SERPL-MCNC: 407 MCG/DL (ref 298–536)
TRANSFERRIN SERPL-MCNC: 273 MG/DL (ref 200–360)
VIT B12 BLD-MCNC: 320 PG/ML (ref 211–946)
WBC NRBC COR # BLD: 7 10*3/MM3 (ref 3.4–10.8)

## 2022-07-22 PROCEDURE — 99214 OFFICE O/P EST MOD 30 MIN: CPT | Performed by: INTERNAL MEDICINE

## 2022-07-22 PROCEDURE — 36415 COLL VENOUS BLD VENIPUNCTURE: CPT

## 2022-07-22 PROCEDURE — 82746 ASSAY OF FOLIC ACID SERUM: CPT

## 2022-07-22 PROCEDURE — 84466 ASSAY OF TRANSFERRIN: CPT

## 2022-07-22 PROCEDURE — 83540 ASSAY OF IRON: CPT

## 2022-07-22 PROCEDURE — 85060 BLOOD SMEAR INTERPRETATION: CPT

## 2022-07-22 PROCEDURE — 82607 VITAMIN B-12: CPT

## 2022-07-22 PROCEDURE — 85025 COMPLETE CBC W/AUTO DIFF WBC: CPT

## 2022-07-22 PROCEDURE — 80053 COMPREHEN METABOLIC PANEL: CPT

## 2022-07-22 PROCEDURE — 83615 LACTATE (LD) (LDH) ENZYME: CPT

## 2022-07-22 PROCEDURE — 82728 ASSAY OF FERRITIN: CPT

## 2022-07-22 NOTE — PROGRESS NOTES
Follow Up Office Visit      Date: 2022     Patient Name: Oneyda Luna  MRN: [id number]  : 1950  Referring Physician: Hospital Follow up      Chief Complaint:  Follow-up for mediastinal adenopathy     History of Present Illness: Oneyda Luna is a pleasant 71 y.o. female with a past medical history of hypothyroidism, tobacco abuse, anxiety, hyperlipidemia, CVA, CAD who presents today for evaluation of mediastinal adenopathy. The patient is accompanied by their  who contributes to the history of their care.  Patient was recently hospitalized for altered mental status secondary to UTI/pyelonephritis as well as concerns for a left vertebral artery dissection.  As part of her work-up, she had a CT scan of her chest which was notable for mildly enlarged calcified and noncalcified mediastinal and hilar lymph nodes measuring up to 1.8 cm in the subcarinal region.  The rest of her CT scan showed no evidence of primary lesion.  She also had an MRI of her brain which did not reveal any concerning cancerous lesions.  She is a former smoker x30 years but quit in 2017.  She denies any chest pain, shortness of breath, chest tightness.  She is overall anxious.  She states she is up-to-date on her mammograms and colonoscopies with her primary care physician.  She is compliant with other home medications     Interval History:   Presents to clinic for follow-up.  Has noted worsening fatigue over the past few weeks.  Denies any dark tarry stools.  Denies any bleeding episodes.  Denies any unexplained fevers, chills, night sweats    Oncology History:    Oncology/Hematology History    No history exists.       Subjective      Review of Systems:   Constitutional: Negative for fevers, chills, or weight loss  Eyes: Negative for blurred vision or discharge         Ear/Nose/Throat: Negative for difficulty swallowing, sore throat, LAD                                                       Respiratory: Negative for  cough, SOA, wheezing                                                                                        Cardiovascular: Negative for chest pain or palpitations                                                                  Gastrointestinal: Negative for nausea, vomiting or diarrhea                                                                     Genitourinary: Negative for dysuria or hematuria                                                                                           Musculoskeletal: Negative for any joint pains or muscle aches                                                                        Neurologic: Negative for any weakness, headaches, dizziness                                                                         Hematologic: Negative for any easy bleeding or bruising                                                                                   Psychiatric: Negative for anxiety or depression                          Past Medical History/Past Surgical History/ Family History/ Social History: Reviewed by me and unchanged from my previous documentation done on June 2022.     Medications:     Current Outpatient Medications:   •  aspirin 81 MG chewable tablet, Chew 1 tablet Daily., Disp: 30 tablet, Rfl: 0  •  atorvastatin (LIPITOR) 40 MG tablet, Take 40 mg by mouth every night at bedtime., Disp: , Rfl:   •  baclofen (LIORESAL) 10 MG tablet, Take 10 mg by mouth 2 (Two) Times a Day As Needed., Disp: , Rfl:   •  celecoxib (CeleBREX) 200 MG capsule, Take 200 mg by mouth Daily., Disp: , Rfl:   •  clopidogrel (PLAVIX) 75 MG tablet, Take 75 mg by mouth Daily. 6/30, Disp: , Rfl:   •  donepezil (ARICEPT) 5 MG tablet, Take 5 mg by mouth every night at bedtime., Disp: , Rfl:   •  estradiol (CLIMARA) 0.0375 MG/24HR, APPLY 1 PATCH TOPICALLY ONCE A WEEK AS DIRECTED, Disp: , Rfl:   •  FLUoxetine (PROzac) 20 MG capsule, Take 20 mg by mouth Daily., Disp: , Rfl:   •  gabapentin (NEURONTIN) 300 MG  "capsule, Take 300 mg by mouth 3 (Three) Times a Day., Disp: , Rfl:   •  HM Loratadine 10 MG tablet, TAKE ONE TABLET BY MOUTH EVERY MORNING AS DIRECTED, Disp: , Rfl:   •  HYDROcodone-acetaminophen (NORCO) 5-325 MG per tablet, Take 1 tablet by mouth 2 (Two) Times a Day As Needed. for pain, Disp: , Rfl:   •  hydrOXYzine pamoate (VISTARIL) 25 MG capsule, Take 25 mg by mouth 3 (Three) Times a Day As Needed., Disp: , Rfl:   •  ibuprofen (ADVIL,MOTRIN) 800 MG tablet, Take 800 mg by mouth Every 6 (Six) Hours As Needed for Mild Pain ., Disp: , Rfl:   •  levothyroxine (SYNTHROID, LEVOTHROID) 88 MCG tablet, Take 88 mcg by mouth Daily., Disp: , Rfl:   •  metoprolol succinate XL (TOPROL-XL) 25 MG 24 hr tablet, Take 1 tablet by mouth Daily., Disp: 30 tablet, Rfl: 0  •  nitroglycerin (NITROSTAT) 0.4 MG SL tablet, DISSOLVE 1 TABLET UNDER THE TONGUE EVERY 5 MINUTES AS NEEDED FOR CHEST PAIN. DO NOT EXCEED A TOTAL OF 3 DOSES IN 15 MINUTES., Disp: , Rfl:   •  ondansetron (ZOFRAN) 4 MG tablet, Take 4 mg by mouth Every 6 (Six) Hours As Needed., Disp: , Rfl:   •  vitamin D (ERGOCALCIFEROL) 1.25 MG (96817 UT) capsule capsule, Take 50,000 Units by mouth Every 7 (Seven) Days.  , Disp: , Rfl:     Allergies:   Allergies   Allergen Reactions   • Molds & Smuts Other (See Comments)     Sneezing and congestion         Objective     Physical Exam:  Vital Signs:   Vitals:    07/22/22 1008   BP: 124/54  Comment: LUE   Pulse: 72   Resp: 18   Temp: 96.9 °F (36.1 °C)   TempSrc: Infrared   SpO2: 94%  Comment: RA   Weight: 59.4 kg (131 lb)   Height: 170.2 cm (67\")   PainSc: 0-No pain     Pain Score    07/22/22 1008   PainSc: 0-No pain     ECOG Performance Status: 0 - Asymptomatic    Constitutional: NAD, ECOG 0  Eyes: PERRLA, scleral anicteric  ENT: No LAD, no thyromegaly  Respiratory: CTAB, no wheezing, rales, rhonchi  Cardiovascular: RRR, no murmurs, pulses 2+ bilaterally  Abdomen: soft, NT/ND, no HSM  Musculoskeletal: strength 5/5 bilaterally, no " c/c/e  Neurologic: A&O x 3, CN II-XII intact grossly    Results Review:   Admission on 2022, Discharged on 2022   Component Date Value Ref Range Status   • Reference Lab Report 2022    Final                    Value:[facility]  [address] [place] [address]  Phone: [phone] or [phone]  Fax: [phone]  Attila Barba M.D., Medical Director    PATIENT NAME                                 LABORATORY NO.  EULOGIO KWONG OSB.                           [id number]  [id number]                                     AGE                SEX     SSN            CLIENT REF #  [facility]                       71       1950   F       [id number]    [id number]  [address]                          REQUESTING M.D.       ATTENDING M.D..        [place] [address]                            KAREN DESOUZA  DATE COLLECTED        DATE RECEIVED          DATE REPORTED  2022            2022             07/15/2022    DIAGNOSIS:  A.      BRONCH BRUSH, RIGHT UPPER LOBE:  Negative for malignant cells.  B.      BRONCHIAL LAVAGE, RIGHT UPPER LOBE:  Negative for malignant cells.    MICROSCOPIC DESCRIPTION:  A.                               Numerous ciliated bronchial cells, few histiocytes and chronic  inflammatory cells with mucoid background.  B.     Benign bronchial cells, macrophages, and chronic inflammatory cells are  present.    Professional interpretation rendered by Attila Barba M.D., F.C.A.P. at [facility], [employer], [address] [place] [address].    CLINICAL HISTORY:  Lung mass    SPECIMENS SUBMITTED:  A.    BRONCH BRUSH, RIGHT UPPER LOBE  B.    BRONCHIAL LAVAGE, RIGHT UPPER LOBE    GROSS SPECIMEN DESCRIPTION:  A.     1 Brush in fixative  B.     40cc of clear colorless fluid with scant sediment in fixative    REVIEWED, DIAGNOSED AND ELECTRONICALLY  SIGNED BY:    Attila Barba M.D., F.C.A.P.  CPT  CODES:  88112x2     • Fungus Culture 07/14/2022 No fungus isolated at 1 week   Preliminary   • AFB Culture 07/14/2022 No AFB isolated at 1 week   Preliminary   • AFB Stain 07/14/2022 No acid fast bacilli seen on concentrated smear   Preliminary   • Fungal Stain 07/14/2022 No yeast or hyphal elements seen   Final   • Respiratory Culture 07/14/2022 No growth   Final   • Gram Stain 07/14/2022 Moderate (3+) WBCs per low power field   Final   • Gram Stain 07/14/2022 Rare (1+) Epithelial cells per low power field   Final   • Gram Stain 07/14/2022 No organisms seen   Final   • Case Report 07/14/2022    Final                    Value:Surgical Pathology Report                         Case: [id number]                                  Authorizing Provider:  Phillip Rowan MD Collected:           07/14/2022 07:59 AM          Ordering Location:     [facility]   Received:            07/14/2022 08:52 AM                                 ENDO SUITES                                                                  Pathologist:           Todd Monsivais MD                                                     Specimens:   1) - Lymph Node, Station 7 node tbna bx and flow cytometry                                          2) - Lymph Node, Station 4R node tbna bx                                                            3) - Lymph Node, Station 10R node tbna bx                                                 • Clinical Information 07/14/2022    Final                    Value:This result contains rich text formatting which cannot be displayed here.   • Final Diagnosis 07/14/2022    Final                    Value:This result contains rich text formatting which cannot be displayed here.   • Gross Description 07/14/2022    Final                    Value:This result contains rich text formatting which cannot be displayed here.   • Microscopic Description 07/14/2022    Final                    Value:This result  contains rich text formatting which cannot be displayed here.   • Flow Cytometry Summary 07/14/2022    Final                    Value:This result contains rich text formatting which cannot be displayed here.       NM PET/CT Skull Base to Mid Thigh    Result Date: 6/29/2022  Narrative: DATE OF EXAM: 6/29/2022 8:15 AM  PROCEDURE: NM PET/CT SKULL BASE TO MID THIGH-  INDICATIONS: lymphoma; C85.92-Non-Hodgkin lymphoma, unspecified, intrathoracic lymph nodes  TECHNIQUE: 10.74 mCi of F-18 labeled FDG was administered intravenously followed by PET imaging from the skull vertex through the mid thighs. Low-dose non contrast CT imaging of the same body region was performed. Fused PET-CT images and 3D MIP PET images were utilized for interpretation. Blood glucose level at the time of injection was 79 mg/dl.  COMPARISON: Initial exam for staging. Prior CT chest abdomen and pelvis 6/3/2022  HEAD AND NECK: Physiologic hypermetabolism of the aerodigestive tract structures is present. Right lower cervical and adjacent supraclavicular lymph nodes are present with suspicious hypermetabolism, for example an 8 mm short axis supraclavicular node with maximum SUV 2.8 and medially located somewhat ill-defined noted at the level of the inferior thyroid, maximum SUV 3.4.  CHEST: There is physiologic hypermetabolism of the left ventricular myocardium. Hypermetabolic mediastinal lymph nodes are present, including prominent conglomerate low paratracheal lymph nodes, 13 mm short axis with maximum SUV 4.5. The adjacent right hilar node has maximum SUV 3.8 and along the posterior mediastinum, interposed between the left atrium and adjacent vertebral body, there is a 11 mm partially calcified lymph node with maximum SUV 4.3.  ABDOMEN AND PELVIS: There is physiologic activity of the gastrointestinal and genitourinary tracts, without focal hypermetabolism concerning for acute or neoplastic process. Osseous structures are unremarkable diffusely.       Impression: Mildly prominent and borderline enlarged low right cervical, right supraclavicular, mediastinal and right hilar lymph nodes are present, demonstrating low level FDG hypermetabolism. Given findings on PET and partially calcified appearance of several of these lymph nodes, in the absence of tissue diagnosis demonstrating lymphoproliferative disorder such as lymphoma, a chronic or infectious/reactive etiology is not entirely excluded.  This report was finalized on 6/29/2022 10:00 AM by Hawk Pelletier.        Assessment / Plan      Assessment/Plan:   1. Mediastinal lymphadenopathy (Primary)  -Unclear etiology and incidentally noted on CT scan during her recent hospitalization.  Differential including malignant versus reactive adenopathy  -No significant primary lesion noted on CT C/A/P and MRI brain  -LDH mildly elevated in May 2022 to around 250  -PET/CT in June 2022 concerning for enlarging borderline mediastinal and hilar adenopathy with low-level hypermetabolism.  -Bronchoscopy with biopsy with Dr. Rowan in July 2022 only showing benign tissue  -We will check flow cytometry today  -We will plan for repeat CT scans in 3 months    2.  Anemia  - Hemoglobin 10.4 in July 2022  - We will repeat CBC with iron studies, vitamin B12, folate today       Follow Up:   Follow-up in 3 months     Mario Cochran MD  Hematology and Oncology     Please note that portions of this note may have been completed with a voice recognition program. Efforts were made to edit the dictations, but occasionally words are mistranscribed.

## 2022-07-23 LAB
CYTOLOGIST CVX/VAG CYTO: NORMAL
PATH INTERP BLD-IMP: NORMAL

## 2022-07-25 LAB — REF LAB TEST METHOD: NORMAL

## 2022-08-25 LAB
FUNGUS WND CULT: NORMAL
MYCOBACTERIUM SPEC CULT: NORMAL
NIGHT BLUE STAIN TISS: NORMAL

## 2022-10-21 ENCOUNTER — APPOINTMENT (OUTPATIENT)
Dept: CT IMAGING | Facility: HOSPITAL | Age: 72
End: 2022-10-21

## 2022-10-21 ENCOUNTER — TELEPHONE (OUTPATIENT)
Dept: ONCOLOGY | Facility: CLINIC | Age: 72
End: 2022-10-21

## 2022-10-21 NOTE — TELEPHONE ENCOUNTER
Caller: Oneyda Luna    Relationship to patient: Self    Best call back number: [phone]    Chief complaint: CAN. APPT. AS PATIENT IS SICK    Type of visit: FOLLOW UP    Requested date: WILL CALL BACK TO [place]., DID NOT WANT ME TO WT.    If rescheduling, when is the original appointment: 10/21/2022

## (undated) DEVICE — ADAPT SWVL FIBROPTIC BRONCH

## (undated) DEVICE — TRAP,MUCUS SPECIMEN,40CC: Brand: MEDLINE

## (undated) DEVICE — ST EXT MICROBORE FIX M LL 38IN

## (undated) DEVICE — LUER-LOK 360°: Brand: CONNECTA, LUER-LOK

## (undated) DEVICE — BOWL UTIL STRL 32OZ

## (undated) DEVICE — Device: Brand: BALLOON

## (undated) DEVICE — Device: Brand: SINGLE USE ASPIRATION NEEDLE NA-U401SX

## (undated) DEVICE — SINGLE USE SUCTION VALVE MAJ-209: Brand: SINGLE USE SUCTION VALVE (STERILE)

## (undated) DEVICE — SINGLE USE BIOPSY VALVE MAJ-210: Brand: SINGLE USE BIOPSY VALVE (STERILE)

## (undated) DEVICE — BRUSH CYTO BRONCOSCOPE